# Patient Record
Sex: FEMALE | Race: WHITE | NOT HISPANIC OR LATINO | Employment: OTHER | URBAN - NONMETROPOLITAN AREA
[De-identification: names, ages, dates, MRNs, and addresses within clinical notes are randomized per-mention and may not be internally consistent; named-entity substitution may affect disease eponyms.]

---

## 2019-02-11 ENCOUNTER — OFFICE VISIT (OUTPATIENT)
Dept: UROLOGY | Facility: CLINIC | Age: 54
End: 2019-02-11

## 2019-02-11 ENCOUNTER — HOSPITAL ENCOUNTER (OUTPATIENT)
Dept: GENERAL RADIOLOGY | Facility: HOSPITAL | Age: 54
Discharge: HOME | End: 2019-02-11
Admitting: UROLOGY

## 2019-02-11 VITALS — WEIGHT: 196 LBS | BODY MASS INDEX: 32.65 KG/M2 | HEIGHT: 65 IN

## 2019-02-11 DIAGNOSIS — N20.0 KIDNEY STONE: ICD-10-CM

## 2019-02-11 DIAGNOSIS — N20.1 LEFT URETERAL CALCULUS: ICD-10-CM

## 2019-02-11 DIAGNOSIS — N20.0 KIDNEY STONE: Primary | ICD-10-CM

## 2019-02-11 PROCEDURE — 99204 OFFICE O/P NEW MOD 45 MIN: CPT | Performed by: UROLOGY

## 2019-02-11 PROCEDURE — 74018 RADEX ABDOMEN 1 VIEW: CPT | Performed by: RADIOLOGY

## 2019-02-11 PROCEDURE — 74018 RADEX ABDOMEN 1 VIEW: CPT

## 2019-02-11 RX ORDER — HYDROCODONE BITARTRATE AND ACETAMINOPHEN 10; 325 MG/1; MG/1
TABLET ORAL
Refills: 0 | COMMUNITY
Start: 2018-12-11

## 2019-02-11 RX ORDER — TAMSULOSIN HYDROCHLORIDE 0.4 MG/1
CAPSULE ORAL
Refills: 5 | COMMUNITY
Start: 2019-01-30 | End: 2019-12-06 | Stop reason: SDUPTHER

## 2019-02-11 RX ORDER — FUROSEMIDE 20 MG/1
TABLET ORAL DAILY
Refills: 5 | COMMUNITY
Start: 2019-01-27

## 2019-02-11 RX ORDER — ERGOCALCIFEROL 1.25 MG/1
CAPSULE ORAL
Refills: 5 | COMMUNITY
Start: 2019-01-27 | End: 2019-12-23 | Stop reason: ALTCHOICE

## 2019-02-11 RX ORDER — LEVOTHYROXINE SODIUM 0.05 MG/1
TABLET ORAL
Refills: 5 | COMMUNITY
Start: 2019-01-27

## 2019-02-11 RX ORDER — CHLORAL HYDRATE 500 MG
CAPSULE ORAL
Refills: 6 | COMMUNITY
Start: 2019-01-03

## 2019-02-11 RX ORDER — AMITRIPTYLINE HYDROCHLORIDE 25 MG/1
TABLET, FILM COATED ORAL
Refills: 5 | COMMUNITY
Start: 2019-01-27

## 2019-02-11 RX ORDER — HYDROXYZINE PAMOATE 25 MG/1
CAPSULE ORAL
Refills: 6 | COMMUNITY
Start: 2019-01-27 | End: 2019-12-06 | Stop reason: SDUPTHER

## 2019-02-11 RX ORDER — GLIPIZIDE 10 MG/1
10 TABLET ORAL 2 TIMES DAILY
Refills: 5 | COMMUNITY
Start: 2019-01-30 | End: 2019-12-23 | Stop reason: ALTCHOICE

## 2019-02-11 RX ORDER — INSULIN HUMAN 100 [IU]/ML
INJECTION, SUSPENSION SUBCUTANEOUS
Refills: 5 | COMMUNITY
Start: 2019-01-15 | End: 2019-12-23 | Stop reason: ALTCHOICE

## 2019-02-11 RX ORDER — GABAPENTIN 300 MG/1
CAPSULE ORAL
Refills: 0 | COMMUNITY
Start: 2019-02-08

## 2019-02-11 RX ORDER — BLOOD SUGAR DIAGNOSTIC
STRIP MISCELLANEOUS
Refills: 5 | COMMUNITY
Start: 2019-02-07

## 2019-02-11 RX ORDER — OXYCODONE AND ACETAMINOPHEN 10; 325 MG/1; MG/1
1 TABLET ORAL EVERY 6 HOURS PRN
Qty: 15 TABLET | Refills: 0 | Status: SHIPPED | OUTPATIENT
Start: 2019-02-11 | End: 2019-12-06

## 2019-02-11 RX ORDER — DICYCLOMINE HCL 20 MG
TABLET ORAL
Refills: 6 | COMMUNITY
Start: 2019-01-27

## 2019-02-11 RX ORDER — PRAVASTATIN SODIUM 10 MG
TABLET ORAL
Refills: 6 | COMMUNITY
Start: 2019-02-06

## 2019-02-11 RX ORDER — PEN NEEDLE, DIABETIC 31 GX5/16"
NEEDLE, DISPOSABLE MISCELLANEOUS
Refills: 5 | COMMUNITY
Start: 2019-01-27

## 2019-02-11 RX ORDER — FENOFIBRATE 134 MG/1
CAPSULE ORAL
Refills: 4 | COMMUNITY
Start: 2019-01-27 | End: 2021-09-13 | Stop reason: SDUPTHER

## 2019-02-11 RX ORDER — HYDROCHLOROTHIAZIDE 25 MG/1
TABLET ORAL DAILY
Refills: 5 | COMMUNITY
Start: 2019-01-27

## 2019-02-11 RX ORDER — ATENOLOL 50 MG/1
50 TABLET ORAL DAILY
Refills: 5 | COMMUNITY
Start: 2019-01-27

## 2019-02-11 NOTE — PROGRESS NOTES
Chief Complaint:          Chief Complaint   Patient presents with   • Nephrolithiasis       HPI:   53 y.o. female.  53-year-old white female who was referred from the [facility] I seen her several years ago.  She brought with her ultrasound reports indicating a previous cholecystectomy, diffuse fatty infiltration of the liver and the suspected upper pole left renal calculus with a questionable small left cyst.  She's having significant pain consistent with the ureter stone she is on chronic pain medication from [facility].  I went ahead and got a KUB today indicating the stone has migrated to the distal ureter.  I'm going to give her pain medication, Flomax and I'll see her back on Thursday, February 14 for repeat evaluation and consideration of whether to proceed with surgical intervention.  It's my gut feeling that she has a high likelihood of spontaneous passage    Past Medical History:      History reviewed. No pertinent past medical history.      Current Meds:     Current Outpatient Medications   Medication Sig Dispense Refill   • amitriptyline (ELAVIL) 25 MG tablet TK 1 T PO  HS  5   • atenolol (TENORMIN) 50 MG tablet Take 50 mg by mouth Daily.  5   • B-D ULTRAFINE III SHORT PEN 31G X 8 MM misc U UTD  5   • DEXILANT 30 MG capsule TK 1 C PO QD  5   • dicyclomine (BENTYL) 20 MG tablet TK 1 T PO  TID PRN  6   • fenofibrate micronized (LOFIBRA) 134 MG capsule TK ONE C PO  QD  4   • furosemide (LASIX) 20 MG tablet Take  by mouth Daily.  5   • gabapentin (NEURONTIN) 300 MG capsule TK ONE C PO  TID  0   • glipiZIDE (GLUCOTROL) 10 MG tablet Take 10 mg by mouth 2 (Two) Times a Day.  5   • HUMULIN 70/30 KWIKPEN (70-30) 100 UNIT/ML suspension pen-injector INJECT 60 UNITS UNDER THE SKIN BID  5   • hydrochlorothiazide (HYDRODIURIL) 25 MG tablet Take  by mouth Daily.  5   • HYDROcodone-acetaminophen (NORCO)  MG per tablet TK 1 T PO  BID PRN  0   • hydrOXYzine pamoate (VISTARIL) 25 MG capsule TK  ONE C PO  TID  6   • levothyroxine (SYNTHROID, LEVOTHROID) 50 MCG tablet TK 1 T PO  DAILY  5   • Omega-3 Fatty Acids (FISH OIL) 1000 MG capsule capsule TK 1 C PO QID  6   • ONETOUCH VERIO test strip USE TO TEST SUGAR BEFORE MEALS AND AT BEDTIME  5   • pravastatin (PRAVACHOL) 10 MG tablet TK 1 T PO  QHS  6   • tamsulosin (FLOMAX) 0.4 MG capsule 24 hr capsule TK ONE C PO DAILY  5   • vitamin D (ERGOCALCIFEROL) 13136 units capsule capsule TK 1 C PO ONCE WEEKLY  5     No current facility-administered medications for this visit.         Allergies:      Allergies   Allergen Reactions   • Azithromycin Itching   • Tylenol With Codeine #3 [Acetaminophen-Codeine] Arrhythmia        Past Surgical History:     History reviewed. No pertinent surgical history.      Social History:     Social History     Socioeconomic History   • Marital status: Unknown     Spouse name: Not on file   • Number of children: Not on file   • Years of education: Not on file   • Highest education level: Not on file   Social Needs   • Financial resource strain: Not on file   • Food insecurity - worry: Not on file   • Food insecurity - inability: Not on file   • Transportation needs - medical: Not on file   • Transportation needs - non-medical: Not on file   Occupational History   • Not on file   Tobacco Use   • Smoking status: Not on file   Substance and Sexual Activity   • Alcohol use: Not on file   • Drug use: Not on file   • Sexual activity: Not on file   Other Topics Concern   • Not on file   Social History Narrative   • Not on file       Family History:     History reviewed. No pertinent family history.    Review of Systems:     Review of Systems   Constitutional: Negative.  Negative for activity change, appetite change, chills, diaphoresis, fatigue and unexpected weight change.   HENT: Negative for congestion, dental problem, drooling, ear discharge, ear pain, facial swelling, hearing loss, mouth sores, nosebleeds, postnasal drip, rhinorrhea, sinus  pressure, sneezing, sore throat, tinnitus, trouble swallowing and voice change.    Eyes: Negative.  Negative for photophobia, pain, discharge, redness, itching and visual disturbance.   Respiratory: Negative.  Negative for apnea, cough, choking, chest tightness, shortness of breath, wheezing and stridor.    Cardiovascular: Negative.  Negative for chest pain, palpitations and leg swelling.   Gastrointestinal: Negative.  Negative for abdominal distention, abdominal pain, anal bleeding, blood in stool, constipation, diarrhea, nausea, rectal pain and vomiting.   Endocrine: Negative.  Negative for cold intolerance, heat intolerance, polydipsia, polyphagia and polyuria.   Genitourinary: Positive for frequency, pelvic pain and urgency.   Musculoskeletal: Negative.  Negative for arthralgias, back pain, gait problem, joint swelling, myalgias, neck pain and neck stiffness.   Skin: Negative.  Negative for color change, pallor, rash and wound.   Allergic/Immunologic: Negative.  Negative for environmental allergies, food allergies and immunocompromised state.   Neurological: Negative.  Negative for dizziness, tremors, seizures, syncope, facial asymmetry, speech difficulty, weakness, light-headedness, numbness and headaches.   Hematological: Negative.  Negative for adenopathy. Does not bruise/bleed easily.   Psychiatric/Behavioral: Negative for agitation, behavioral problems, confusion, decreased concentration, dysphoric mood, hallucinations, self-injury, sleep disturbance and suicidal ideas. The patient is not nervous/anxious and is not hyperactive.    All other systems reviewed and are negative.      Physical Exam:     Physical Exam   Constitutional: She appears well-developed and well-nourished.   HENT:   Head: Normocephalic and atraumatic.   Right Ear: External ear normal.   Left Ear: External ear normal.   Mouth/Throat: Oropharynx is clear and moist.   Eyes: Conjunctivae are normal. Pupils are equal, round, and reactive to  light.   Cardiovascular: Normal rate, regular rhythm, normal heart sounds and intact distal pulses.   Pulmonary/Chest: Effort normal and breath sounds normal.   Abdominal: Soft. Bowel sounds are normal. She exhibits no distension and no mass. There is no tenderness. There is no rebound and no guarding.   Genitourinary: No vaginal discharge found.   Musculoskeletal: Normal range of motion.   Neurological: She is alert. She has normal reflexes.   Skin: Skin is warm and dry.   Psychiatric: She has a normal mood and affect. Her behavior is normal. Judgment and thought content normal.       I have reviewed the following portions of the patient's history: allergies, current medications, past family history, past medical history, past social history, past surgical history, problem list and ROS and confirm it's accurate.      Procedure:       Assessment/Plan:   Ureteral calculus-patient has been diagnosed with a ureteral calculus.  We have discussed the various parameters regarding spontaneous passage including the notion that a 2 mm stone has a high likelihood of spontaneous passage versus a larger stone being caught up in the upper areas of the urinary tract.  We also discussed the medical management of stone disease and the use of medical expulsive therapy in the form of Flomax.  This is used in an off label setting.  We discussed the indicators for intervention including  absolute indicators such as sepsis and uncontrollable severe pain as well as  the relative indicators of moderate pain that is well-controlled with various analgesia.  I also talked about nonoperative management including ambulation and increasing fluids and hot tub as being an effective adjuncts in the treatment of a ureteral stone.     Patient's Body mass index is 33.12 kg/m². BMI is above normal parameters. Recommendations include: educational material.          This document has been electronically signed by ROSALIND WILLIAM MD February 11,  2019 8:25 AM

## 2019-02-14 ENCOUNTER — OFFICE VISIT (OUTPATIENT)
Dept: UROLOGY | Facility: CLINIC | Age: 54
End: 2019-02-14

## 2019-02-14 VITALS — WEIGHT: 196 LBS | BODY MASS INDEX: 32.65 KG/M2 | HEIGHT: 65 IN

## 2019-02-14 DIAGNOSIS — N20.1 LEFT URETERAL CALCULUS: Primary | ICD-10-CM

## 2019-02-14 PROCEDURE — 99213 OFFICE O/P EST LOW 20 MIN: CPT | Performed by: UROLOGY

## 2019-02-14 NOTE — PROGRESS NOTES
Chief Complaint:          Chief Complaint   Patient presents with   • Flank Pain     f/u       HPI:   53 y.o. female.  53-year-old white female who was referred from the [facility] I seen her several years ago.  She brought with her ultrasound reports indicating a previous cholecystectomy, diffuse fatty infiltration of the liver and the suspected upper pole left renal calculus with a questionable small left cyst.  She's having significant pain consistent with the ureter stone she is on chronic pain medication from [facility].  I went ahead and got a KUB today indicating the stone has migrated to the distal ureter.  I'm going to give her pain medication, Flomax and I'll see her back on Thursday, February 14 for repeat evaluation and consideration of whether to proceed with surgical intervention.  It's my gut feeling that she has a high likelihood of spontaneous passage    She returns she still hasn't passed the stone she has adequate supplies of pain medication if she doesn't pass it I'm when he give her one additional.  An enema to recommend surgical intervention I'll see her back in 2 weeks  Past Medical History:        Past Medical History:   Diagnosis Date   • Chronic back pain    • Diabetes mellitus (CMS/HCC)    • Diverticulitis    • Gastritis    • High cholesterol    • Hypertension    • IBS (irritable bowel syndrome)          Current Meds:     Current Outpatient Medications   Medication Sig Dispense Refill   • amitriptyline (ELAVIL) 25 MG tablet TK 1 T PO  HS  5   • atenolol (TENORMIN) 50 MG tablet Take 50 mg by mouth Daily.  5   • B-D ULTRAFINE III SHORT PEN 31G X 8 MM misc U UTD  5   • DEXILANT 30 MG capsule TK 1 C PO QD  5   • dicyclomine (BENTYL) 20 MG tablet TK 1 T PO  TID PRN  6   • fenofibrate micronized (LOFIBRA) 134 MG capsule TK ONE C PO  QD  4   • furosemide (LASIX) 20 MG tablet Take  by mouth Daily.  5   • gabapentin (NEURONTIN) 300 MG capsule TK ONE C PO  TID  0   • glipiZIDE  (GLUCOTROL) 10 MG tablet Take 10 mg by mouth 2 (Two) Times a Day.  5   • HUMULIN 70/30 KWIKPEN (70-30) 100 UNIT/ML suspension pen-injector INJECT 60 UNITS UNDER THE SKIN BID  5   • hydrochlorothiazide (HYDRODIURIL) 25 MG tablet Take  by mouth Daily.  5   • HYDROcodone-acetaminophen (NORCO)  MG per tablet TK 1 T PO  BID PRN  0   • hydrOXYzine pamoate (VISTARIL) 25 MG capsule TK ONE C PO  TID  6   • levothyroxine (SYNTHROID, LEVOTHROID) 50 MCG tablet TK 1 T PO  DAILY  5   • Omega-3 Fatty Acids (FISH OIL) 1000 MG capsule capsule TK 1 C PO QID  6   • ONETOUCH VERIO test strip USE TO TEST SUGAR BEFORE MEALS AND AT BEDTIME  5   • oxyCODONE-acetaminophen (PERCOCET)  MG per tablet Take 1 tablet by mouth Every 6 (Six) Hours As Needed for Severe Pain . 15 tablet 0   • pravastatin (PRAVACHOL) 10 MG tablet TK 1 T PO  QHS  6   • tamsulosin (FLOMAX) 0.4 MG capsule 24 hr capsule TK ONE C PO DAILY  5   • vitamin D (ERGOCALCIFEROL) 29999 units capsule capsule TK 1 C PO ONCE WEEKLY  5     No current facility-administered medications for this visit.         Allergies:      Allergies   Allergen Reactions   • Azithromycin Itching   • Tylenol With Codeine #3 [Acetaminophen-Codeine] Arrhythmia        Past Surgical History:     Past Surgical History:   Procedure Laterality Date   • APPENDECTOMY     • CHOLECYSTECTOMY     • HYSTERECTOMY           Social History:     Social History     Socioeconomic History   • Marital status:      Spouse name: Not on file   • Number of children: Not on file   • Years of education: Not on file   • Highest education level: Not on file   Social Needs   • Financial resource strain: Not on file   • Food insecurity - worry: Not on file   • Food insecurity - inability: Not on file   • Transportation needs - medical: Not on file   • Transportation needs - non-medical: Not on file   Occupational History   • Not on file   Tobacco Use   • Smoking status: Former Smoker   • Smokeless tobacco: Never  Used   Substance and Sexual Activity   • Alcohol use: No     Frequency: Never   • Drug use: No   • Sexual activity: Yes     Partners: Male     Birth control/protection: None   Other Topics Concern   • Not on file   Social History Narrative   • Not on file       Family History:     Family History   Problem Relation Age of Onset   • No Known Problems Father    • No Known Problems Mother        Review of Systems:     Review of Systems   Constitutional: Negative.  Negative for activity change, appetite change, chills, diaphoresis, fatigue and unexpected weight change.   HENT: Negative for congestion, dental problem, drooling, ear discharge, ear pain, facial swelling, hearing loss, mouth sores, nosebleeds, postnasal drip, rhinorrhea, sinus pressure, sneezing, sore throat, tinnitus, trouble swallowing and voice change.    Eyes: Negative.  Negative for photophobia, pain, discharge, redness, itching and visual disturbance.   Respiratory: Negative.  Negative for apnea, cough, choking, chest tightness, shortness of breath, wheezing and stridor.    Cardiovascular: Negative.  Negative for chest pain, palpitations and leg swelling.   Gastrointestinal: Negative.  Negative for abdominal distention, abdominal pain, anal bleeding, blood in stool, constipation, diarrhea, nausea, rectal pain and vomiting.   Endocrine: Negative.  Negative for cold intolerance, heat intolerance, polydipsia, polyphagia and polyuria.   Musculoskeletal: Negative.  Negative for arthralgias, back pain, gait problem, joint swelling, myalgias, neck pain and neck stiffness.   Skin: Negative.  Negative for color change, pallor, rash and wound.   Allergic/Immunologic: Negative.  Negative for environmental allergies, food allergies and immunocompromised state.   Neurological: Negative.  Negative for dizziness, tremors, seizures, syncope, facial asymmetry, speech difficulty, weakness, light-headedness, numbness and headaches.   Hematological: Negative.  Negative  for adenopathy. Does not bruise/bleed easily.   Psychiatric/Behavioral: Negative for agitation, behavioral problems, confusion, decreased concentration, dysphoric mood, hallucinations, self-injury, sleep disturbance and suicidal ideas. The patient is not nervous/anxious and is not hyperactive.    All other systems reviewed and are negative.      Physical Exam:     Physical Exam   Constitutional: She appears well-developed and well-nourished.   HENT:   Head: Normocephalic and atraumatic.   Right Ear: External ear normal.   Left Ear: External ear normal.   Mouth/Throat: Oropharynx is clear and moist.   Eyes: Conjunctivae are normal. Pupils are equal, round, and reactive to light.   Cardiovascular: Normal rate, regular rhythm, normal heart sounds and intact distal pulses.   Pulmonary/Chest: Effort normal and breath sounds normal.   Abdominal: Soft. Bowel sounds are normal. She exhibits no distension and no mass. There is no tenderness. There is no rebound and no guarding.   Genitourinary: No vaginal discharge found.   Musculoskeletal: Normal range of motion.   Neurological: She is alert. She has normal reflexes.   Skin: Skin is warm and dry.   Psychiatric: She has a normal mood and affect. Her behavior is normal. Judgment and thought content normal.       I have reviewed the following portions of the patient's history: allergies, current medications, past family history, past medical history, past social history, past surgical history, problem list and ROS and confirm it's accurate.      Procedure:       Assessment/Plan:   Ureteral calculus-patient has been diagnosed with a ureteral calculus.  We have discussed the various parameters regarding spontaneous passage including the notion that a 2 mm stone has a high likelihood of spontaneous passage versus a larger stone being caught up in the upper areas of the urinary tract.  We also discussed the medical management of stone disease and the use of medical expulsive  therapy in the form of Flomax.  This is used in an off label setting.  We discussed the indicators for intervention including  absolute indicators such as sepsis and uncontrollable severe pain as well as  the relative indicators of moderate pain that is well-controlled with various analgesia.  I also talked about nonoperative management including ambulation and increasing fluids and hot tub as being an effective adjuncts in the treatment of a ureteral stone.     Patient's Body mass index is 33.12 kg/m². BMI is above normal parameters. Recommendations include: educational material.          This document has been electronically signed by ROSALIND WILLIAM MD February 14, 2019 3:06 PM

## 2019-02-15 ENCOUNTER — TELEPHONE (OUTPATIENT)
Dept: UROLOGY | Facility: CLINIC | Age: 54
End: 2019-02-15

## 2019-02-15 DIAGNOSIS — R11.2 NAUSEA AND VOMITING, INTRACTABILITY OF VOMITING NOT SPECIFIED, UNSPECIFIED VOMITING TYPE: Primary | ICD-10-CM

## 2019-02-15 RX ORDER — ONDANSETRON 4 MG/1
4 TABLET, FILM COATED ORAL DAILY PRN
Qty: 30 TABLET | Refills: 1 | Status: SHIPPED | OUTPATIENT
Start: 2019-02-15

## 2019-02-15 NOTE — TELEPHONE ENCOUNTER
Patient called and needs zofran called to her pharmacy. She states that Dr. Gunderson offered it to her at her visit in the [place] office 2/14/19.

## 2019-12-03 ENCOUNTER — OFFICE VISIT (OUTPATIENT)
Dept: UROLOGY | Facility: CLINIC | Age: 54
End: 2019-12-03

## 2019-12-03 ENCOUNTER — HOSPITAL ENCOUNTER (OUTPATIENT)
Dept: GENERAL RADIOLOGY | Facility: HOSPITAL | Age: 54
Discharge: HOME | End: 2019-12-03
Admitting: UROLOGY

## 2019-12-03 DIAGNOSIS — R10.9 FLANK PAIN: Primary | ICD-10-CM

## 2019-12-03 DIAGNOSIS — R10.9 FLANK PAIN: ICD-10-CM

## 2019-12-03 DIAGNOSIS — N20.1 LEFT URETERAL CALCULUS: ICD-10-CM

## 2019-12-03 LAB
BILIRUB BLD-MCNC: NEGATIVE MG/DL
CLARITY, POC: CLEAR
COLOR UR: YELLOW
GLUCOSE UR STRIP-MCNC: NEGATIVE MG/DL
KETONES UR QL: NEGATIVE
LEUKOCYTE EST, POC: NEGATIVE
NITRITE UR-MCNC: NEGATIVE MG/ML
PH UR: 6 [PH] (ref 5–8)
PROT UR STRIP-MCNC: ABNORMAL MG/DL
RBC # UR STRIP: ABNORMAL /UL
SP GR UR: 1.02 (ref 1–1.03)
UROBILINOGEN UR QL: NORMAL

## 2019-12-03 PROCEDURE — 74018 RADEX ABDOMEN 1 VIEW: CPT

## 2019-12-03 PROCEDURE — 99213 OFFICE O/P EST LOW 20 MIN: CPT | Performed by: UROLOGY

## 2019-12-03 PROCEDURE — 74018 RADEX ABDOMEN 1 VIEW: CPT | Performed by: RADIOLOGY

## 2019-12-03 RX ORDER — TAMSULOSIN HYDROCHLORIDE 0.4 MG/1
1 CAPSULE ORAL NIGHTLY
Qty: 30 CAPSULE | Refills: 5 | Status: SHIPPED | OUTPATIENT
Start: 2019-12-03 | End: 2021-05-06 | Stop reason: SDUPTHER

## 2019-12-03 RX ORDER — PROMETHAZINE HYDROCHLORIDE 25 MG/1
25 TABLET ORAL EVERY 6 HOURS PRN
Qty: 21 TABLET | Refills: 2 | Status: SHIPPED | OUTPATIENT
Start: 2019-12-03

## 2019-12-03 RX ORDER — OXYCODONE AND ACETAMINOPHEN 10; 325 MG/1; MG/1
1 TABLET ORAL EVERY 6 HOURS PRN
Qty: 12 TABLET | Refills: 0 | Status: SHIPPED | OUTPATIENT
Start: 2019-12-03 | End: 2021-12-06 | Stop reason: ALTCHOICE

## 2019-12-03 NOTE — PROGRESS NOTES
Chief Complaint:          Chief Complaint   Patient presents with   • Flank Pain       HPI:   54 y.o. female who was referred from the [facility] I seen her several years ago.  She brought with her ultrasound reports indicating a previous cholecystectomy, diffuse fatty infiltration of the liver and the suspected upper pole left renal calculus with a questionable small left cyst.  She's having significant pain consistent with the ureter stone she is on chronic pain medication from [facility].  I went ahead and got a KUB today indicating the stone has migrated to the distal ureter.  I'm going to give her pain medication, Flomax and I'll see her back on Thursday, February 14 for repeat evaluation and consideration of whether to proceed with surgical intervention.  It's my gut feeling that she has a high likelihood of spontaneous passage    She returns she still hasn't passed the stone she has adequate supplies of pain medication if she doesn't pass it I'm when he give her one additional.  An enema to recommend surgical intervention I'll see her back in 2 weeks.  She returns today.  Her urine is positive for blood she has significant left-sided stone pain.  Appears to have a small distal left ureteral calculus on the KUB.  She has no absolute indicators for intervention.  I will set her up for pain medication I will see her back on 12 6 in the afternoon if she has persistent stones I am in a recommend intervention next week      Past Medical History:        Past Medical History:   Diagnosis Date   • Chronic back pain    • Diabetes mellitus (CMS/HCC)    • Diverticulitis    • Gastritis    • High cholesterol    • Hypertension    • IBS (irritable bowel syndrome)          Current Meds:     Current Outpatient Medications   Medication Sig Dispense Refill   • amitriptyline (ELAVIL) 25 MG tablet TK 1 T PO  HS  5   • atenolol (TENORMIN) 50 MG tablet Take 50 mg by mouth Daily.  5   • B-D ULTRAFINE III SHORT  PEN 31G X 8 MM misc U UTD  5   • DEXILANT 30 MG capsule TK 1 C PO QD  5   • dicyclomine (BENTYL) 20 MG tablet TK 1 T PO  TID PRN  6   • fenofibrate micronized (LOFIBRA) 134 MG capsule TK ONE C PO  QD  4   • furosemide (LASIX) 20 MG tablet Take  by mouth Daily.  5   • gabapentin (NEURONTIN) 300 MG capsule TK ONE C PO  TID  0   • glipiZIDE (GLUCOTROL) 10 MG tablet Take 10 mg by mouth 2 (Two) Times a Day.  5   • HUMULIN 70/30 KWIKPEN (70-30) 100 UNIT/ML suspension pen-injector INJECT 60 UNITS UNDER THE SKIN BID  5   • hydrochlorothiazide (HYDRODIURIL) 25 MG tablet Take  by mouth Daily.  5   • HYDROcodone-acetaminophen (NORCO)  MG per tablet TK 1 T PO  BID PRN  0   • hydrOXYzine pamoate (VISTARIL) 25 MG capsule TK ONE C PO  TID  6   • levothyroxine (SYNTHROID, LEVOTHROID) 50 MCG tablet TK 1 T PO  DAILY  5   • Omega-3 Fatty Acids (FISH OIL) 1000 MG capsule capsule TK 1 C PO QID  6   • ondansetron (ZOFRAN) 4 MG tablet Take 1 tablet by mouth Daily As Needed for Nausea or Vomiting. 30 tablet 1   • ONETOUCH VERIO test strip USE TO TEST SUGAR BEFORE MEALS AND AT BEDTIME  5   • oxyCODONE-acetaminophen (PERCOCET)  MG per tablet Take 1 tablet by mouth Every 6 (Six) Hours As Needed for Severe Pain . 15 tablet 0   • pravastatin (PRAVACHOL) 10 MG tablet TK 1 T PO  QHS  6   • tamsulosin (FLOMAX) 0.4 MG capsule 24 hr capsule TK ONE C PO DAILY  5   • vitamin D (ERGOCALCIFEROL) 25378 units capsule capsule TK 1 C PO ONCE WEEKLY  5     No current facility-administered medications for this visit.         Allergies:      Allergies   Allergen Reactions   • Azithromycin Itching   • Tylenol With Codeine #3 [Acetaminophen-Codeine] Arrhythmia        Past Surgical History:     Past Surgical History:   Procedure Laterality Date   • APPENDECTOMY     • CHOLECYSTECTOMY     • HYSTERECTOMY           Social History:     Social History     Socioeconomic History   • Marital status:      Spouse name: Not on file   • Number of children:  Not on file   • Years of education: Not on file   • Highest education level: Not on file   Tobacco Use   • Smoking status: Former Smoker   • Smokeless tobacco: Never Used   Substance and Sexual Activity   • Alcohol use: No     Frequency: Never   • Drug use: No   • Sexual activity: Yes     Partners: Male     Birth control/protection: None       Family History:     Family History   Problem Relation Age of Onset   • No Known Problems Father    • No Known Problems Mother        Review of Systems:     Review of Systems   Constitutional: Negative.  Negative for activity change, appetite change, chills, diaphoresis, fatigue and unexpected weight change.   HENT: Negative for congestion, dental problem, drooling, ear discharge, ear pain, facial swelling, hearing loss, mouth sores, nosebleeds, postnasal drip, rhinorrhea, sinus pressure, sneezing, sore throat, tinnitus, trouble swallowing and voice change.    Eyes: Negative.  Negative for photophobia, pain, discharge, redness, itching and visual disturbance.   Respiratory: Negative.  Negative for apnea, cough, choking, chest tightness, shortness of breath, wheezing and stridor.    Cardiovascular: Negative.  Negative for chest pain, palpitations and leg swelling.   Gastrointestinal: Negative.  Negative for abdominal distention, abdominal pain, anal bleeding, blood in stool, constipation, diarrhea, nausea, rectal pain and vomiting.   Endocrine: Negative.  Negative for cold intolerance, heat intolerance, polydipsia, polyphagia and polyuria.   Musculoskeletal: Negative.  Negative for arthralgias, back pain, gait problem, joint swelling, myalgias, neck pain and neck stiffness.   Skin: Negative.  Negative for color change, pallor, rash and wound.   Allergic/Immunologic: Negative.  Negative for environmental allergies, food allergies and immunocompromised state.   Neurological: Negative.  Negative for dizziness, tremors, seizures, syncope, facial asymmetry, speech difficulty,  weakness, light-headedness, numbness and headaches.   Hematological: Negative.  Negative for adenopathy. Does not bruise/bleed easily.   Psychiatric/Behavioral: Negative for agitation, behavioral problems, confusion, decreased concentration, dysphoric mood, hallucinations, self-injury, sleep disturbance and suicidal ideas. The patient is not nervous/anxious and is not hyperactive.    All other systems reviewed and are negative.      Physical Exam:     Physical Exam   Constitutional: She appears well-developed and well-nourished.   HENT:   Head: Normocephalic and atraumatic.   Right Ear: External ear normal.   Left Ear: External ear normal.   Mouth/Throat: Oropharynx is clear and moist.   Eyes: Conjunctivae are normal. Pupils are equal, round, and reactive to light.   Cardiovascular: Normal rate, regular rhythm, normal heart sounds and intact distal pulses.   Pulmonary/Chest: Effort normal and breath sounds normal.   Abdominal: Soft. Bowel sounds are normal. She exhibits no distension and no mass. There is no tenderness. There is no rebound and no guarding.   Genitourinary: No vaginal discharge found.   Musculoskeletal: Normal range of motion.   Neurological: She is alert. She has normal reflexes.   Skin: Skin is warm and dry.   Psychiatric: She has a normal mood and affect. Her behavior is normal. Judgment and thought content normal.       I have reviewed the following portions of the patient's history: allergies, current medications, past family history, past medical history, past social history, past surgical history, problem list and ROS and confirm it's accurate.      Procedure:       Assessment/Plan:   Ureteral calculus-patient has been diagnosed with a ureteral calculus.  We have discussed the various parameters regarding spontaneous passage including the notion that a 2 mm stone has a high likelihood of spontaneous passage versus a larger stone being caught up in the upper areas of the urinary tract.  We also  discussed the medical management of stone disease and the use of medical expulsive therapy in the form of Flomax.  This is used in an off label setting.  We discussed the indicators for intervention including  absolute indicators such as sepsis and uncontrollable severe pain as well as  the relative indicators of moderate pain that is well-controlled with various analgesia.  I also talked about nonoperative management including ambulation and increasing fluids and hot tub as being an effective adjuncts in the treatment of a ureteral stone.  Narcotic pain medication-patient has significant acute pain that I believe would be an indication for the use of narcotic pain medication.  I discussed the significant risks of pain medication and the fact that this will be a short only option and I will give her no more than a three-day supply of pain medication.  And I will not plan long-term medication and that this will be sent to a pain clinic if at all becomes necessary.  We discussed signing a pain medication agreement and the fact that we're going to run a state [facility] review to be sure the patient is not getting pain medication from elsewhere.  If this is the case we will not give pain medication.  As part of the patient's treatment plan of there being prescribed a controlled substance with potential for abuse.  This patient has been wait aware of the appropriate dose of such medications including, the risk for somnolence, limited ability to drive and/or safety and the significant potential for overdose.  It is been made clear that these medications are for the prescribed patient only without concomitant use of alcohol or other sepsis unless prescribed by the medical provider.  Has completed prescribing agreement detailing the terms of continue prescribing him a controlled substance.  Including monitoring Christie ports, the possibility of urine drug screens and pedal counts.  The patient is aware that we reviewed [facility]RISTIE  reports on a regular basis and scan them into the chart.  History and physical examination exhibited continued safe and appropriate use of controlled substances.  Also discussed the fact that the new [place] legislation allows only a three-day prescription for pain medication.  In this situation he will be referred to a chronic pain clinic.            Patient's There is no height or weight on file to calculate BMI. BMI is above normal parameters. Recommendations include: educational material.              This document has been electronically signed by ROSALIND WILLIAM MD December 3, 2019 10:55 AM

## 2019-12-06 ENCOUNTER — OFFICE VISIT (OUTPATIENT)
Dept: UROLOGY | Facility: CLINIC | Age: 54
End: 2019-12-06

## 2019-12-06 VITALS — HEIGHT: 65 IN | WEIGHT: 223 LBS | BODY MASS INDEX: 37.15 KG/M2

## 2019-12-06 DIAGNOSIS — N20.1 LEFT URETERAL CALCULUS: Primary | ICD-10-CM

## 2019-12-06 PROCEDURE — 99213 OFFICE O/P EST LOW 20 MIN: CPT | Performed by: UROLOGY

## 2019-12-06 RX ORDER — LISINOPRIL 5 MG/1
TABLET ORAL DAILY
Refills: 2 | COMMUNITY
Start: 2019-11-29

## 2019-12-06 RX ORDER — HYDROXYZINE HYDROCHLORIDE 25 MG/1
TABLET, FILM COATED ORAL
Refills: 2 | COMMUNITY
Start: 2019-11-29 | End: 2021-12-06 | Stop reason: ALTCHOICE

## 2019-12-06 RX ORDER — VENLAFAXINE HYDROCHLORIDE 150 MG/1
CAPSULE, EXTENDED RELEASE ORAL
Refills: 4 | COMMUNITY
Start: 2019-11-29

## 2019-12-06 RX ORDER — HYDROCODONE BITARTRATE AND ACETAMINOPHEN 10; 325 MG/1; MG/1
1 TABLET ORAL EVERY 6 HOURS PRN
Qty: 20 TABLET | Refills: 0 | Status: SHIPPED | OUTPATIENT
Start: 2019-12-06 | End: 2021-05-06 | Stop reason: SDUPTHER

## 2019-12-06 NOTE — PROGRESS NOTES
Chief Complaint:          Chief Complaint   Patient presents with   • Flank Pain     3 day rtn       HPI:   54 y.o. female who was referred from the [facility] I seen her several years ago.  She brought with her ultrasound reports indicating a previous cholecystectomy, diffuse fatty infiltration of the liver and the suspected upper pole left renal calculus with a questionable small left cyst.  She's having significant pain consistent with the ureter stone she is on chronic pain medication from [facility].  I went ahead and got a KUB today indicating the stone has migrated to the distal ureter.  I'm going to give her pain medication, Flomax and I'll see her back on Thursday, February 14 for repeat evaluation and consideration of whether to proceed with surgical intervention.  It's my gut feeling that she has a high likelihood of spontaneous passage    She returns she still hasn't passed the stone she has adequate supplies of pain medication if she doesn't pass it I'm when he give her one additional.  An enema to recommend surgical intervention I'll see her back in 2 weeks.  She returns today.  Her urine is positive for blood she has significant left-sided stone pain.  Appears to have a small distal left ureteral calculus on the KUB.  She has no absolute indicators for intervention.  I will set her up for pain medication I will see her back on 12- 6 in the afternoon if she has persistent stones I am in a recommend intervention next week.  She returns today she still having significant pain on the left side I am to set up for ureteroscopy on Wednesday 12/11      Past Medical History:        Past Medical History:   Diagnosis Date   • Chronic back pain    • Diabetes mellitus (CMS/HCC)    • Diverticulitis    • Gastritis    • High cholesterol    • Hypertension    • IBS (irritable bowel syndrome)          Current Meds:     Current Outpatient Medications   Medication Sig Dispense Refill   •  amitriptyline (ELAVIL) 25 MG tablet TK 1 T PO  HS  5   • atenolol (TENORMIN) 50 MG tablet Take 50 mg by mouth Daily.  5   • B-D ULTRAFINE III SHORT PEN 31G X 8 MM misc U UTD  5   • DEXILANT 30 MG capsule TK 1 C PO QD  5   • dicyclomine (BENTYL) 20 MG tablet TK 1 T PO  TID PRN  6   • fenofibrate micronized (LOFIBRA) 134 MG capsule TK ONE C PO  QD  4   • furosemide (LASIX) 20 MG tablet Take  by mouth Daily.  5   • gabapentin (NEURONTIN) 300 MG capsule TK ONE C PO  TID  0   • glipiZIDE (GLUCOTROL) 10 MG tablet Take 10 mg by mouth 2 (Two) Times a Day.  5   • HUMULIN 70/30 KWIKPEN (70-30) 100 UNIT/ML suspension pen-injector INJECT 60 UNITS UNDER THE SKIN BID  5   • hydrochlorothiazide (HYDRODIURIL) 25 MG tablet Take  by mouth Daily.  5   • HYDROcodone-acetaminophen (NORCO)  MG per tablet TK 1 T PO  BID PRN  0   • hydrOXYzine (ATARAX) 25 MG tablet TK 1 T PO  TID  2   • levothyroxine (SYNTHROID, LEVOTHROID) 50 MCG tablet TK 1 T PO  DAILY  5   • lisinopril (PRINIVIL,ZESTRIL) 5 MG tablet Take  by mouth Daily.  2   • Omega-3 Fatty Acids (FISH OIL) 1000 MG capsule capsule TK 1 C PO QID  6   • ondansetron (ZOFRAN) 4 MG tablet Take 1 tablet by mouth Daily As Needed for Nausea or Vomiting. 30 tablet 1   • ONETOUCH VERIO test strip USE TO TEST SUGAR BEFORE MEALS AND AT BEDTIME  5   • oxyCODONE-acetaminophen (PERCOCET)  MG per tablet Take 1 tablet by mouth Every 6 (Six) Hours As Needed for Moderate Pain . 12 tablet 0   • pravastatin (PRAVACHOL) 10 MG tablet TK 1 T PO  QHS  6   • promethazine (PHENERGAN) 25 MG tablet Take 1 tablet by mouth Every 6 (Six) Hours As Needed for Nausea or Vomiting. 21 tablet 2   • tamsulosin (FLOMAX) 0.4 MG capsule 24 hr capsule Take 1 capsule by mouth Every Night. 30 capsule 5   • venlafaxine XR (EFFEXOR-XR) 150 MG 24 hr capsule TK 1 C PO D  4   • vitamin D (ERGOCALCIFEROL) 01907 units capsule capsule TK 1 C PO ONCE WEEKLY  5     No current facility-administered medications for this visit.          Allergies:      Allergies   Allergen Reactions   • Tylenol With Codeine #3 [Acetaminophen-Codeine] Arrhythmia   • Azithromycin Itching        Past Surgical History:     Past Surgical History:   Procedure Laterality Date   • APPENDECTOMY     • CHOLECYSTECTOMY     • HYSTERECTOMY           Social History:     Social History     Socioeconomic History   • Marital status:      Spouse name: Not on file   • Number of children: Not on file   • Years of education: Not on file   • Highest education level: Not on file   Tobacco Use   • Smoking status: Former Smoker   • Smokeless tobacco: Never Used   Substance and Sexual Activity   • Alcohol use: No     Frequency: Never   • Drug use: No   • Sexual activity: Yes     Partners: Male     Birth control/protection: None       Family History:     Family History   Problem Relation Age of Onset   • No Known Problems Father    • No Known Problems Mother        Review of Systems:     Review of Systems   Constitutional: Negative.  Negative for activity change, appetite change, chills, diaphoresis, fatigue and unexpected weight change.   HENT: Negative for congestion, dental problem, drooling, ear discharge, ear pain, facial swelling, hearing loss, mouth sores, nosebleeds, postnasal drip, rhinorrhea, sinus pressure, sneezing, sore throat, tinnitus, trouble swallowing and voice change.    Eyes: Negative.  Negative for photophobia, pain, discharge, redness, itching and visual disturbance.   Respiratory: Negative.  Negative for apnea, cough, choking, chest tightness, shortness of breath, wheezing and stridor.    Cardiovascular: Negative.  Negative for chest pain, palpitations and leg swelling.   Gastrointestinal: Negative.  Negative for abdominal distention, abdominal pain, anal bleeding, blood in stool, constipation, diarrhea, nausea, rectal pain and vomiting.   Endocrine: Negative.  Negative for cold intolerance, heat intolerance, polydipsia, polyphagia and polyuria.    Musculoskeletal: Negative.  Negative for arthralgias, back pain, gait problem, joint swelling, myalgias, neck pain and neck stiffness.   Skin: Negative.  Negative for color change, pallor, rash and wound.   Allergic/Immunologic: Negative.  Negative for environmental allergies, food allergies and immunocompromised state.   Neurological: Negative.  Negative for dizziness, tremors, seizures, syncope, facial asymmetry, speech difficulty, weakness, light-headedness, numbness and headaches.   Hematological: Negative.  Negative for adenopathy. Does not bruise/bleed easily.   Psychiatric/Behavioral: Negative for agitation, behavioral problems, confusion, decreased concentration, dysphoric mood, hallucinations, self-injury, sleep disturbance and suicidal ideas. The patient is not nervous/anxious and is not hyperactive.    All other systems reviewed and are negative.      Physical Exam:     Physical Exam   Constitutional: She appears well-developed and well-nourished.   HENT:   Head: Normocephalic and atraumatic.   Right Ear: External ear normal.   Left Ear: External ear normal.   Mouth/Throat: Oropharynx is clear and moist.   Eyes: Pupils are equal, round, and reactive to light. Conjunctivae are normal.   Cardiovascular: Normal rate, regular rhythm, normal heart sounds and intact distal pulses.   Pulmonary/Chest: Effort normal and breath sounds normal.   Abdominal: Soft. Bowel sounds are normal. She exhibits no distension and no mass. There is no tenderness. There is no rebound and no guarding.   Genitourinary: No vaginal discharge found.   Musculoskeletal: Normal range of motion.   Neurological: She is alert. She has normal reflexes.   Skin: Skin is warm and dry.   Psychiatric: She has a normal mood and affect. Her behavior is normal. Judgment and thought content normal.       I have reviewed the following portions of the patient's history: allergies, current medications, past family history, past medical history, past  social history, past surgical history, problem list and ROS and confirm it's accurate.      Procedure:       Assessment/Plan:   Ureteral calculus-patient has been diagnosed with a ureteral calculus.  We have discussed the various parameters regarding spontaneous passage including the notion that a 2 mm stone has a high likelihood of spontaneous passage versus a larger stone being caught up in the upper areas of the urinary tract.  We also discussed the medical management of stone disease and the use of medical expulsive therapy in the form of Flomax.  This is used in an off label setting.  We discussed the indicators for intervention including  absolute indicators such as sepsis and uncontrollable severe pain as well as  the relative indicators of moderate pain that is well-controlled with various analgesia.  I also talked about nonoperative management including ambulation and increasing fluids and hot tub as being an effective adjuncts in the treatment of a ureteral stone.  I am to set her up for left-sided ureteroscopy            Patient's Body mass index is 37.69 kg/m². BMI is above normal parameters. Recommendations include: educational material.              This document has been electronically signed by ROSALIND WILLIAM MD December 6, 2019 1:44 PM

## 2019-12-09 RX ORDER — GENTAMICIN SULFATE 80 MG/100ML
80 INJECTION, SOLUTION INTRAVENOUS ONCE
Status: CANCELLED | OUTPATIENT
Start: 2019-12-11 | End: 2019-12-09

## 2019-12-23 ENCOUNTER — OFFICE VISIT (OUTPATIENT)
Dept: UROLOGY | Facility: CLINIC | Age: 54
End: 2019-12-23

## 2019-12-23 ENCOUNTER — HOSPITAL ENCOUNTER (OUTPATIENT)
Dept: GENERAL RADIOLOGY | Facility: HOSPITAL | Age: 54
Discharge: HOME | End: 2019-12-23
Admitting: NURSE PRACTITIONER

## 2019-12-23 VITALS
SYSTOLIC BLOOD PRESSURE: 128 MMHG | DIASTOLIC BLOOD PRESSURE: 68 MMHG | HEART RATE: 88 BPM | BODY MASS INDEX: 36 KG/M2 | HEIGHT: 66 IN | WEIGHT: 224 LBS | RESPIRATION RATE: 16 BRPM

## 2019-12-23 DIAGNOSIS — R10.9 FLANK PAIN: ICD-10-CM

## 2019-12-23 DIAGNOSIS — R10.30 LOWER ABDOMINAL PAIN: Primary | ICD-10-CM

## 2019-12-23 LAB
BILIRUB BLD-MCNC: NEGATIVE MG/DL
CLARITY, POC: CLEAR
COLOR UR: ABNORMAL
GLUCOSE UR STRIP-MCNC: ABNORMAL MG/DL
KETONES UR QL: NEGATIVE
LEUKOCYTE EST, POC: NEGATIVE
NITRITE UR-MCNC: NEGATIVE MG/ML
PH UR: 5.5 [PH] (ref 5–8)
PROT UR STRIP-MCNC: NEGATIVE MG/DL
RBC # UR STRIP: ABNORMAL /UL
SP GR UR: 1.02 (ref 1–1.03)
UROBILINOGEN UR QL: NORMAL

## 2019-12-23 PROCEDURE — 74018 RADEX ABDOMEN 1 VIEW: CPT | Performed by: RADIOLOGY

## 2019-12-23 PROCEDURE — 74018 RADEX ABDOMEN 1 VIEW: CPT

## 2019-12-23 PROCEDURE — 99214 OFFICE O/P EST MOD 30 MIN: CPT | Performed by: NURSE PRACTITIONER

## 2019-12-23 NOTE — PROGRESS NOTES
"Chief Complaint:          Chief Complaint   Patient presents with   • Follow-up     LLQ pain, pain radiates to left lower back area, Pain with urination   • Blood in Urine     Left Flank Pain/ Lower Abdominal pain    HPI:   54 y.o. female.  Patient returns for follow-up today,   she she was a \"No call No show\" for her scheduled surgery appointment on December 11.  She states she was snowed in.    Patient is seen for left flank pain today.  She is having significant left-sided pain and bladder pressure consistent with a ureteral/kidney stone.  She has a history of kidney stone, and chronic back pain.  She states this has been ongoing for 2 weeks, she has had  ER visits in [place] she states.    Upon exam, she has no suprapubic discomfort, no CVA tenderness, no nausea or vomiting, fever or chills.  She reports urinary  symptoms of gross hematuria, dysuria, urgency and frequency.  Her urine dipstick is negative for infection, she has 3+ microscopic hematuria.    We will get a KUB      Past Medical History:        Past Medical History:   Diagnosis Date   • Chronic back pain    • Diabetes mellitus (CMS/HCC)    • Diverticulitis    • Gastritis    • High cholesterol    • Hypertension    • IBS (irritable bowel syndrome)      The following portions of the patient's history were reviewed and updated as appropriate: allergies, current medications, past family history, past medical history, past social history, past surgical history and problem list.    Current Meds:     Current Outpatient Medications   Medication Sig Dispense Refill   • amitriptyline (ELAVIL) 25 MG tablet TK 1 T PO  HS  5   • atenolol (TENORMIN) 50 MG tablet Take 50 mg by mouth Daily.  5   • B-D ULTRAFINE III SHORT PEN 31G X 8 MM misc U UTD  5   • DEXILANT 30 MG capsule TK 1 C PO QD  5   • dicyclomine (BENTYL) 20 MG tablet TK 1 T PO  TID PRN  6   • fenofibrate micronized (LOFIBRA) 134 MG capsule TK ONE C PO  QD  4   • furosemide (LASIX) 20 MG tablet Take  by " mouth Daily.  5   • gabapentin (NEURONTIN) 300 MG capsule TK ONE C PO  TID  0   • hydrochlorothiazide (HYDRODIURIL) 25 MG tablet Take  by mouth Daily.  5   • HYDROcodone-acetaminophen (NORCO)  MG per tablet TK 1 T PO  BID PRN  0   • HYDROcodone-acetaminophen (NORCO)  MG per tablet Take 1 tablet by mouth Every 6 (Six) Hours As Needed for Moderate Pain . 20 tablet 0   • hydrOXYzine (ATARAX) 25 MG tablet TK 1 T PO  TID  2   • insulin aspart (novoLOG) 100 UNIT/ML injection Inject  under the skin into the appropriate area as directed 3 (Three) Times a Day Before Meals.     • insulin detemir (LEVEMIR) 100 UNIT/ML injection Inject 50 Units under the skin into the appropriate area as directed 2 (Two) Times a Day.     • levothyroxine (SYNTHROID, LEVOTHROID) 50 MCG tablet TK 1 T PO  DAILY  5   • lisinopril (PRINIVIL,ZESTRIL) 5 MG tablet Take  by mouth Daily.  2   • metFORMIN (GLUCOPHAGE) 500 MG tablet Take 500 mg by mouth 2 (Two) Times a Day With Meals.     • Omega-3 Fatty Acids (FISH OIL) 1000 MG capsule capsule TK 1 C PO QID  6   • ondansetron (ZOFRAN) 4 MG tablet Take 1 tablet by mouth Daily As Needed for Nausea or Vomiting. 30 tablet 1   • ONETOUCH VERIO test strip USE TO TEST SUGAR BEFORE MEALS AND AT BEDTIME  5   • oxyCODONE-acetaminophen (PERCOCET)  MG per tablet Take 1 tablet by mouth Every 6 (Six) Hours As Needed for Moderate Pain . 12 tablet 0   • pravastatin (PRAVACHOL) 10 MG tablet TK 1 T PO  QHS  6   • promethazine (PHENERGAN) 25 MG tablet Take 1 tablet by mouth Every 6 (Six) Hours As Needed for Nausea or Vomiting. 21 tablet 2   • tamsulosin (FLOMAX) 0.4 MG capsule 24 hr capsule Take 1 capsule by mouth Every Night. 30 capsule 5   • venlafaxine XR (EFFEXOR-XR) 150 MG 24 hr capsule TK 1 C PO D  4     No current facility-administered medications for this visit.         Allergies:      Allergies   Allergen Reactions   • Tylenol With Codeine #3 [Acetaminophen-Codeine] Arrhythmia   • Azithromycin  Itching        Past Surgical History:     Past Surgical History:   Procedure Laterality Date   • APPENDECTOMY     • CHOLECYSTECTOMY     • HYSTERECTOMY           Social History:     Social History     Socioeconomic History   • Marital status:      Spouse name: Not on file   • Number of children: Not on file   • Years of education: Not on file   • Highest education level: Not on file   Tobacco Use   • Smoking status: Former Smoker   • Smokeless tobacco: Never Used   Substance and Sexual Activity   • Alcohol use: No     Frequency: Never   • Drug use: No   • Sexual activity: Yes     Partners: Male     Birth control/protection: None       Family History:     Family History   Problem Relation Age of Onset   • No Known Problems Father    • No Known Problems Mother        Review of Systems:     Review of Systems   Constitutional: Positive for fatigue. Negative for activity change, chills and fever.   HENT: Negative for congestion, dental problem and drooling.    Eyes: Negative for blurred vision.   Gastrointestinal: Negative for abdominal pain, nausea and vomiting.   Genitourinary: Positive for dysuria, flank pain, frequency, hematuria and urgency. Negative for difficulty urinating, dyspareunia, genital sores, pelvic pain, pelvic pressure, urinary incontinence, vaginal discharge and vaginal pain.   Musculoskeletal: Positive for back pain.   Skin: Positive for pallor.   Neurological: Negative for dizziness, headache and confusion.   Psychiatric/Behavioral: Negative for behavioral problems and decreased concentration.        Physical Exam:     Physical Exam   Constitutional: She is oriented to person, place, and time. She appears well-developed and well-nourished. She appears distressed.   HENT:   Head: Normocephalic and atraumatic.   Eyes: Pupils are equal, round, and reactive to light. EOM are normal.   Neck: Normal range of motion. No tracheal deviation present. No thyromegaly present.   Cardiovascular: Normal rate  and regular rhythm.   No murmur heard.  Pulmonary/Chest: Effort normal and breath sounds normal. No stridor. No respiratory distress. She has no wheezes.   Abdominal: Soft. Bowel sounds are normal. There is tenderness.   Genitourinary: Vagina normal. There is no tenderness on the right labia. There is no tenderness on the left labia. No vaginal discharge found.   Musculoskeletal: Normal range of motion. She exhibits tenderness. She exhibits no edema or deformity.   Neurological: She is alert and oriented to person, place, and time. No cranial nerve deficit or sensory deficit. Coordination normal.   Skin: Skin is warm and dry. No rash noted. No erythema. No pallor.   Psychiatric: She has a normal mood and affect. Her behavior is normal. Judgment and thought content normal.       Procedure:       Assessment/Plan:     Left flank pain/left lower quadrant abdominal pain: Patient reports significant left flank pain and abdominal pain that has been ongoing for 2 weeks.  She missed her scheduled surgery appointment related to weather conditions.    We we both reviewed her KUB, which is basically negative for stones.  It shows no calcifications seen overlying the kidneys, or along the course of the ureters.    We discussed a kidney stone prevention diet to include increasing p.o. fluid intake, to at least 1 to 2 L of water daily.  She is to avoid caffeine products such as cola, coffee, and to avoid soft or soda drinks.  She is to decrease her sodium consumption as in  Fast foods, pavon, salted nuts, canned foods, and smoked/cured foods. She is also to decrease her oxalate consumption, as in spinach, [place] greens, and Rhubarb.  Also important is to decrease protein intake, as in red meats, peanut butter, and also avoid nuts.      We will see patient on As Needed Basis  (PRN)    Patient reports that she is not currently experiencing any symptoms of urinary incontinence.      Patient's Body mass index is 36.71 kg/m². BMI is  above normal parameters. Recommendations include: educational material, exercise counseling and nutrition counseling.    Smoking Cessation Counseling:  Never a smoker.  Patient does not currently use any tobacco products.      Counseling was given to patient for the following topics patient and family education including: Kidney stone prevention diet and impressions as follows: Negative KUB for stones, pain most likely related to chronic back pain. The interim medical history and current results were reviewed.  A treatment plan with follow-up was made for Flank pain,  Lower abdominal pain [R10.30].              This document has been electronically signed by Griselda Cheng-Akwa, APRN December 23, 2019 7:27 PM

## 2020-10-13 ENCOUNTER — HOSPITAL ENCOUNTER (OUTPATIENT)
Dept: GENERAL RADIOLOGY | Facility: HOSPITAL | Age: 55
Discharge: HOME | End: 2020-10-13
Admitting: UROLOGY

## 2020-10-13 ENCOUNTER — OFFICE VISIT (OUTPATIENT)
Dept: UROLOGY | Facility: CLINIC | Age: 55
End: 2020-10-13

## 2020-10-13 VITALS — HEIGHT: 66 IN | BODY MASS INDEX: 36 KG/M2 | TEMPERATURE: 98.5 F | WEIGHT: 224 LBS

## 2020-10-13 DIAGNOSIS — R33.9 INCOMPLETE BLADDER EMPTYING: ICD-10-CM

## 2020-10-13 DIAGNOSIS — N39.0 RECURRENT UTI: Primary | ICD-10-CM

## 2020-10-13 DIAGNOSIS — N20.0 RENAL CALCULI: ICD-10-CM

## 2020-10-13 LAB
BILIRUB BLD-MCNC: NEGATIVE MG/DL
CLARITY, POC: CLEAR
COLOR UR: YELLOW
GLUCOSE UR STRIP-MCNC: NEGATIVE MG/DL
KETONES UR QL: NEGATIVE
LEUKOCYTE EST, POC: NEGATIVE
NITRITE UR-MCNC: NEGATIVE MG/ML
PH UR: 5.5 [PH] (ref 5–8)
PROT UR STRIP-MCNC: ABNORMAL MG/DL
RBC # UR STRIP: NEGATIVE /UL
SP GR UR: 1.03 (ref 1–1.03)
UROBILINOGEN UR QL: NORMAL

## 2020-10-13 PROCEDURE — 99214 OFFICE O/P EST MOD 30 MIN: CPT | Performed by: UROLOGY

## 2020-10-13 PROCEDURE — 74019 RADEX ABDOMEN 2 VIEWS: CPT | Performed by: RADIOLOGY

## 2020-10-13 PROCEDURE — 74018 RADEX ABDOMEN 1 VIEW: CPT

## 2020-10-13 PROCEDURE — 51798 US URINE CAPACITY MEASURE: CPT | Performed by: UROLOGY

## 2020-10-13 RX ORDER — EXENATIDE 2 MG/.85ML
INJECTION, SUSPENSION, EXTENDED RELEASE SUBCUTANEOUS
COMMUNITY
Start: 2020-09-14

## 2020-10-13 RX ORDER — INSULIN LISPRO 100 U/ML
INJECTION, SOLUTION SUBCUTANEOUS
COMMUNITY
Start: 2020-09-12

## 2020-10-13 RX ORDER — HYDROCODONE BITARTRATE AND ACETAMINOPHEN 10; 325 MG/1; MG/1
1 TABLET ORAL EVERY 6 HOURS PRN
Qty: 12 TABLET | Refills: 0 | Status: SHIPPED | OUTPATIENT
Start: 2020-10-13 | End: 2021-11-01

## 2020-10-13 RX ORDER — INSULIN GLARGINE 100 [IU]/ML
50 INJECTION, SOLUTION SUBCUTANEOUS
COMMUNITY
Start: 2020-07-12

## 2020-10-13 NOTE — PROGRESS NOTES
Chief Complaint:          Chief Complaint   Patient presents with   • Recurrent UTI       HPI:   55 y.o. female who is well-known to me and passed a number of stones.  A CT was done in [place] but she did not bring her imaging with her KUB today showed bilateral stones not obvious but very small she is severely constipated.  Sure urinalysis is negative.  Will go ahead and recommend observation I will see her back in 1 week if it persists she will need a repeat CT scan here so I can have it available to review.  Renal calculus-we discussed the presence of the stone we discussed the various therapeutic options available including percutaneous nephrostolithotomy, lithotripsy.  We discussed the risks of lithotripsy including the passage of stones the development of a large string of stones in the distal ureter known as Steinstrasse.  In the 3% incidence of that we will need to proceed with a ureteroscopy for obstructing fragments.  Extremely rare incidence of renal hematoma.  And the significance of this.  We discussed the absolute relative indicators for intervention including the presence of sepsis, and pain we cannot control is the primary need for urgent intervention.  We discussed placement of a stent if indicated and the management of the stent as well.      Past Medical History:        Past Medical History:   Diagnosis Date   • Chronic back pain    • Diabetes mellitus (CMS/HCC)    • Diverticulitis    • Gastritis    • High cholesterol    • Hypertension    • IBS (irritable bowel syndrome)          Current Meds:     Current Outpatient Medications   Medication Sig Dispense Refill   • amitriptyline (ELAVIL) 25 MG tablet TK 1 T PO  HS  5   • atenolol (TENORMIN) 50 MG tablet Take 50 mg by mouth Daily.  5   • B-D ULTRAFINE III SHORT PEN 31G X 8 MM misc U UTD  5   • DEXILANT 30 MG capsule TK 1 C PO QD  5   • dicyclomine (BENTYL) 20 MG tablet TK 1 T PO  TID PRN  6   • fenofibrate micronized (LOFIBRA) 134 MG capsule TK  ONE C PO  QD  4   • furosemide (LASIX) 20 MG tablet Take  by mouth Daily.  5   • gabapentin (NEURONTIN) 300 MG capsule TK ONE C PO  TID  0   • hydrochlorothiazide (HYDRODIURIL) 25 MG tablet Take  by mouth Daily.  5   • HYDROcodone-acetaminophen (NORCO)  MG per tablet TK 1 T PO  BID PRN  0   • HYDROcodone-acetaminophen (NORCO)  MG per tablet Take 1 tablet by mouth Every 6 (Six) Hours As Needed for Moderate Pain . 20 tablet 0   • hydrOXYzine (ATARAX) 25 MG tablet TK 1 T PO  TID  2   • insulin aspart (novoLOG) 100 UNIT/ML injection Inject  under the skin into the appropriate area as directed 3 (Three) Times a Day Before Meals.     • insulin detemir (LEVEMIR) 100 UNIT/ML injection Inject 50 Units under the skin into the appropriate area as directed 2 (Two) Times a Day.     • levothyroxine (SYNTHROID, LEVOTHROID) 50 MCG tablet TK 1 T PO  DAILY  5   • lisinopril (PRINIVIL,ZESTRIL) 5 MG tablet Take  by mouth Daily.  2   • metFORMIN (GLUCOPHAGE) 500 MG tablet Take 500 mg by mouth 2 (Two) Times a Day With Meals.     • Omega-3 Fatty Acids (FISH OIL) 1000 MG capsule capsule TK 1 C PO QID  6   • ondansetron (ZOFRAN) 4 MG tablet Take 1 tablet by mouth Daily As Needed for Nausea or Vomiting. 30 tablet 1   • ONETOUCH VERIO test strip USE TO TEST SUGAR BEFORE MEALS AND AT BEDTIME  5   • oxyCODONE-acetaminophen (PERCOCET)  MG per tablet Take 1 tablet by mouth Every 6 (Six) Hours As Needed for Moderate Pain . 12 tablet 0   • pravastatin (PRAVACHOL) 10 MG tablet TK 1 T PO  QHS  6   • promethazine (PHENERGAN) 25 MG tablet Take 1 tablet by mouth Every 6 (Six) Hours As Needed for Nausea or Vomiting. 21 tablet 2   • tamsulosin (FLOMAX) 0.4 MG capsule 24 hr capsule Take 1 capsule by mouth Every Night. 30 capsule 5   • venlafaxine XR (EFFEXOR-XR) 150 MG 24 hr capsule TK 1 C PO D  4     No current facility-administered medications for this visit.         Allergies:      Allergies   Allergen Reactions   • Tylenol With  Codeine #3 [Acetaminophen-Codeine] Arrhythmia   • Azithromycin Itching        Past Surgical History:     Past Surgical History:   Procedure Laterality Date   • APPENDECTOMY     • CHOLECYSTECTOMY     • HYSTERECTOMY           Social History:     Social History     Socioeconomic History   • Marital status:      Spouse name: Not on file   • Number of children: Not on file   • Years of education: Not on file   • Highest education level: Not on file   Tobacco Use   • Smoking status: Former Smoker   • Smokeless tobacco: Never Used   Substance and Sexual Activity   • Alcohol use: No     Frequency: Never   • Drug use: No   • Sexual activity: Yes     Partners: Male     Birth control/protection: None       Family History:     Family History   Problem Relation Age of Onset   • No Known Problems Father    • No Known Problems Mother        Review of Systems:     Review of Systems   Constitutional: Negative.  Negative for activity change, appetite change, chills, diaphoresis, fatigue and unexpected weight change.   HENT: Negative for congestion, dental problem, drooling, ear discharge, ear pain, facial swelling, hearing loss, mouth sores, nosebleeds, postnasal drip, rhinorrhea, sinus pressure, sneezing, sore throat, tinnitus, trouble swallowing and voice change.    Eyes: Negative.  Negative for photophobia, pain, discharge, redness, itching and visual disturbance.   Respiratory: Negative.  Negative for apnea, cough, choking, chest tightness, shortness of breath, wheezing and stridor.    Cardiovascular: Negative.  Negative for chest pain, palpitations and leg swelling.   Gastrointestinal: Negative.  Negative for abdominal distention, abdominal pain, anal bleeding, blood in stool, constipation, diarrhea, nausea, rectal pain and vomiting.   Endocrine: Negative.  Negative for cold intolerance, heat intolerance, polydipsia, polyphagia and polyuria.   Genitourinary: Positive for flank pain.   Musculoskeletal: Negative for  arthralgias, back pain, gait problem, joint swelling, myalgias, neck pain and neck stiffness.   Skin: Negative.  Negative for color change, pallor, rash and wound.   Allergic/Immunologic: Negative.  Negative for environmental allergies, food allergies and immunocompromised state.   Neurological: Negative.  Negative for dizziness, tremors, seizures, syncope, facial asymmetry, speech difficulty, weakness, light-headedness, numbness and headaches.   Hematological: Negative.  Negative for adenopathy. Does not bruise/bleed easily.   Psychiatric/Behavioral: Negative for agitation, behavioral problems, confusion, decreased concentration, dysphoric mood, hallucinations, self-injury, sleep disturbance and suicidal ideas. The patient is not nervous/anxious and is not hyperactive.    All other systems reviewed and are negative.      Physical Exam:     Physical Exam  Constitutional:       Appearance: She is well-developed.   HENT:      Head: Normocephalic and atraumatic.      Right Ear: External ear normal.      Left Ear: External ear normal.   Eyes:      Conjunctiva/sclera: Conjunctivae normal.      Pupils: Pupils are equal, round, and reactive to light.   Cardiovascular:      Rate and Rhythm: Normal rate and regular rhythm.      Heart sounds: Normal heart sounds.   Pulmonary:      Effort: Pulmonary effort is normal.      Breath sounds: Normal breath sounds.   Abdominal:      General: Bowel sounds are normal. There is no distension.      Palpations: Abdomen is soft. There is no mass.      Tenderness: There is no abdominal tenderness. There is no guarding or rebound.   Genitourinary:     Vagina: No vaginal discharge.   Musculoskeletal: Normal range of motion.   Skin:     General: Skin is warm and dry.   Neurological:      Mental Status: She is alert.      Deep Tendon Reflexes: Reflexes are normal and symmetric.   Psychiatric:         Behavior: Behavior normal.         Thought Content: Thought content normal.         Judgment:  Judgment normal.         I have reviewed the following portions of the patient's history: allergies, current medications, past family history, past medical history, past social history, past surgical history, problem list and ROS and confirm it's accurate.      Procedure:       Assessment/Plan:   Renal calculus-we discussed the presence of the stone we discussed the various therapeutic options available including percutaneous nephrostolithotomy, lithotripsy.  We discussed the risks of lithotripsy including the passage of stones the development of a large string of stones in the distal ureter known as Steinstrasse.  In the 3% incidence of that we will need to proceed with a ureteroscopy for obstructing fragments.  Extremely rare incidence of renal hematoma.  And the significance of this.  We discussed the absolute relative indicators for intervention including the presence of sepsis, and pain we cannot control is the primary need for urgent intervention.  We discussed placement of a stent if indicated and the management of the stent as well.  If she persists with the pain I would repeat the CT scan here   narcotic pain medication-patient has significant acute pain that I believe would be an indication for the use of narcotic pain medication.  I discussed the significant risks of pain medication and the fact that this will be a short only option and I will give her no more than a three-day supply of pain medication.  And I will not plan long-term medication and that this will be sent to a pain clinic if at all becomes necessary.  We discussed signing a pain medication agreement and the fact that we're going to run a state [facility] review to be sure the patient is not getting pain medication from elsewhere.  If this is the case we will not give pain medication.  As part of the patient's treatment plan of there being prescribed a controlled substance with potential for abuse.  This patient has been wait aware of the  appropriate dose of such medications including, the risk for somnolence, limited ability to drive and/or safety and the significant potential for overdose.  It is been made clear that these medications are for the prescribed patient only without concomitant use of alcohol or other sepsis unless prescribed by the medical provider.  Has completed prescribing agreement detailing the terms of continue prescribing him a controlled substance.  Including monitoring Christie ports, the possibility of urine drug screens and pedal counts.  The patient is aware that we reviewed CHRISTIE reports on a regular basis and scan them into the chart.  History and physical examination exhibited continued safe and appropriate use of controlled substances.  Also discussed the fact that the new [place] legislation allows only a three-day prescription for pain medication.  In this situation he will be referred to a chronic pain clinic.            Patient's Body mass index is 36.7 kg/m². BMI is above normal parameters. Recommendations include: educational material.              This document has been electronically signed by ROSALIND WILLIAM MD October 13, 2020 08:09 EDT

## 2020-10-14 PROBLEM — N20.0 RENAL CALCULI: Status: ACTIVE | Noted: 2020-10-14

## 2020-10-14 PROBLEM — N39.0 RECURRENT UTI: Status: ACTIVE | Noted: 2020-10-14

## 2020-10-20 ENCOUNTER — OFFICE VISIT (OUTPATIENT)
Dept: UROLOGY | Facility: CLINIC | Age: 55
End: 2020-10-20

## 2020-10-20 VITALS — BODY MASS INDEX: 36.14 KG/M2 | WEIGHT: 224.87 LBS | HEIGHT: 66 IN | TEMPERATURE: 98.4 F

## 2020-10-20 DIAGNOSIS — N20.0 RENAL CALCULI: Primary | ICD-10-CM

## 2020-10-20 PROCEDURE — 99213 OFFICE O/P EST LOW 20 MIN: CPT | Performed by: UROLOGY

## 2020-10-20 NOTE — PROGRESS NOTES
Chief Complaint:          Chief Complaint   Patient presents with   • Nephrolithiasis     1 week follow up       HPI:   55 y.o. female she returns today having passed stones being taken over by Dr. Silva.  I reviewed the CT scan she brought from [facility] which was negative for urolithiasis at this time I gave her reassurance I will see her back on an as needed basis with the admonition to follow-up here with stones other than her primary care physician and then be referred later with imaging that perhaps is not  appropriate      Past Medical History:        Past Medical History:   Diagnosis Date   • Chronic back pain    • Diabetes mellitus (CMS/HCC)    • Diverticulitis    • Gastritis    • High cholesterol    • Hypertension    • IBS (irritable bowel syndrome)          Current Meds:     Current Outpatient Medications   Medication Sig Dispense Refill   • amitriptyline (ELAVIL) 25 MG tablet TK 1 T PO  HS  5   • atenolol (TENORMIN) 50 MG tablet Take 50 mg by mouth Daily.  5   • B-D ULTRAFINE III SHORT PEN 31G X 8 MM misc U UTD  5   • Bydureon BCise 2 MG/0.85ML auto-injector injection INJECT 1 SYRINGE  UNDER SKIN ONCE A WEEK     • DEXILANT 30 MG capsule TK 1 C PO QD  5   • dicyclomine (BENTYL) 20 MG tablet TK 1 T PO  TID PRN  6   • fenofibrate micronized (LOFIBRA) 134 MG capsule TK ONE C PO  QD  4   • furosemide (LASIX) 20 MG tablet Take  by mouth Daily.  5   • gabapentin (NEURONTIN) 300 MG capsule TK ONE C PO  TID  0   • hydrochlorothiazide (HYDRODIURIL) 25 MG tablet Take  by mouth Daily.  5   • HYDROcodone-acetaminophen (NORCO)  MG per tablet TK 1 T PO  BID PRN  0   • HYDROcodone-acetaminophen (NORCO)  MG per tablet Take 1 tablet by mouth Every 6 (Six) Hours As Needed for Moderate Pain . 20 tablet 0   • HYDROcodone-acetaminophen (Norco)  MG per tablet Take 1 tablet by mouth Every 6 (Six) Hours As Needed for Moderate Pain . 12 tablet 0   • hydrOXYzine (ATARAX) 25 MG tablet TK 1 T PO  TID  2   • insulin  aspart (novoLOG) 100 UNIT/ML injection Inject  under the skin into the appropriate area as directed 3 (Three) Times a Day Before Meals.     • insulin detemir (LEVEMIR) 100 UNIT/ML injection Inject 50 Units under the skin into the appropriate area as directed 2 (Two) Times a Day.     • Insulin Glargine (BASAGLAR KWIKPEN) 100 UNIT/ML injection pen 50 Units.     • Insulin Lispro (ADMELOG SOLOSTAR) 100 UNIT/ML injection pen INJECT 25 UNITS UNDER SKIN TID     • levothyroxine (SYNTHROID, LEVOTHROID) 50 MCG tablet TK 1 T PO  DAILY  5   • lisinopril (PRINIVIL,ZESTRIL) 5 MG tablet Take  by mouth Daily.  2   • metFORMIN (GLUCOPHAGE) 500 MG tablet Take 500 mg by mouth 2 (Two) Times a Day With Meals.     • Omega-3 Fatty Acids (FISH OIL) 1000 MG capsule capsule TK 1 C PO QID  6   • ondansetron (ZOFRAN) 4 MG tablet Take 1 tablet by mouth Daily As Needed for Nausea or Vomiting. 30 tablet 1   • ONETOUCH VERIO test strip USE TO TEST SUGAR BEFORE MEALS AND AT BEDTIME  5   • oxyCODONE-acetaminophen (PERCOCET)  MG per tablet Take 1 tablet by mouth Every 6 (Six) Hours As Needed for Moderate Pain . 12 tablet 0   • pravastatin (PRAVACHOL) 10 MG tablet TK 1 T PO  QHS  6   • promethazine (PHENERGAN) 25 MG tablet Take 1 tablet by mouth Every 6 (Six) Hours As Needed for Nausea or Vomiting. 21 tablet 2   • tamsulosin (FLOMAX) 0.4 MG capsule 24 hr capsule Take 1 capsule by mouth Every Night. 30 capsule 5   • venlafaxine XR (EFFEXOR-XR) 150 MG 24 hr capsule TK 1 C PO D  4     No current facility-administered medications for this visit.         Allergies:      Allergies   Allergen Reactions   • Tylenol With Codeine #3 [Acetaminophen-Codeine] Arrhythmia   • Azithromycin Itching        Past Surgical History:     Past Surgical History:   Procedure Laterality Date   • APPENDECTOMY     • CHOLECYSTECTOMY     • HYSTERECTOMY           Social History:     Social History     Socioeconomic History   • Marital status:      Spouse name: Not on  file   • Number of children: Not on file   • Years of education: Not on file   • Highest education level: Not on file   Tobacco Use   • Smoking status: Former Smoker   • Smokeless tobacco: Never Used   Substance and Sexual Activity   • Alcohol use: No     Frequency: Never   • Drug use: No   • Sexual activity: Yes     Partners: Male     Birth control/protection: None       Family History:     Family History   Problem Relation Age of Onset   • No Known Problems Father    • No Known Problems Mother        Review of Systems:     Review of Systems   Constitutional: Negative.  Negative for activity change, appetite change, chills, diaphoresis, fatigue and unexpected weight change.   HENT: Negative for congestion, dental problem, drooling, ear discharge, ear pain, facial swelling, hearing loss, mouth sores, nosebleeds, postnasal drip, rhinorrhea, sinus pressure, sneezing, sore throat, tinnitus, trouble swallowing and voice change.    Eyes: Negative.  Negative for photophobia, pain, discharge, redness, itching and visual disturbance.   Respiratory: Negative.  Negative for apnea, cough, choking, chest tightness, shortness of breath, wheezing and stridor.    Cardiovascular: Negative.  Negative for chest pain, palpitations and leg swelling.   Gastrointestinal: Negative.  Negative for abdominal distention, abdominal pain, anal bleeding, blood in stool, constipation, diarrhea, nausea, rectal pain and vomiting.   Endocrine: Negative.  Negative for cold intolerance, heat intolerance, polydipsia, polyphagia and polyuria.   Musculoskeletal: Negative.  Negative for arthralgias, back pain, gait problem, joint swelling, myalgias, neck pain and neck stiffness.   Skin: Negative.  Negative for color change, pallor, rash and wound.   Allergic/Immunologic: Negative.  Negative for environmental allergies, food allergies and immunocompromised state.   Neurological: Negative.  Negative for dizziness, tremors, seizures, syncope, facial  asymmetry, speech difficulty, weakness, light-headedness, numbness and headaches.   Hematological: Negative.  Negative for adenopathy. Does not bruise/bleed easily.   Psychiatric/Behavioral: Negative for agitation, behavioral problems, confusion, decreased concentration, dysphoric mood, hallucinations, self-injury, sleep disturbance and suicidal ideas. The patient is not nervous/anxious and is not hyperactive.    All other systems reviewed and are negative.      Physical Exam:     Physical Exam  Constitutional:       Appearance: She is well-developed.   HENT:      Head: Normocephalic and atraumatic.      Right Ear: External ear normal.      Left Ear: External ear normal.   Eyes:      Conjunctiva/sclera: Conjunctivae normal.      Pupils: Pupils are equal, round, and reactive to light.   Cardiovascular:      Rate and Rhythm: Normal rate and regular rhythm.      Heart sounds: Normal heart sounds.   Pulmonary:      Effort: Pulmonary effort is normal.      Breath sounds: Normal breath sounds.   Abdominal:      General: Bowel sounds are normal. There is no distension.      Palpations: Abdomen is soft. There is no mass.      Tenderness: There is no abdominal tenderness. There is no guarding or rebound.   Genitourinary:     Vagina: No vaginal discharge.   Musculoskeletal: Normal range of motion.   Skin:     General: Skin is warm and dry.   Neurological:      Mental Status: She is alert.      Deep Tendon Reflexes: Reflexes are normal and symmetric.   Psychiatric:         Behavior: Behavior normal.         Thought Content: Thought content normal.         Judgment: Judgment normal.         I have reviewed the following portions of the patient's history: allergies, current medications, past family history, past medical history, past social history, past surgical history, problem list and ROS and confirm it's accurate.      Procedure:       Assessment/Plan:   Renal calculus-we discussed the presence of the stone we discussed the  various therapeutic options available including percutaneous nephrostolithotomy, lithotripsy.  We discussed the risks of lithotripsy including the passage of stones the development of a large string of stones in the distal ureter known as Steinstrasse.  In the 3% incidence of that we will need to proceed with a ureteroscopy for obstructing fragments.  Extremely rare incidence of renal hematoma.  And the significance of this.  We discussed the absolute relative indicators for intervention including the presence of sepsis, and pain we cannot control is the primary need for urgent intervention.  We discussed placement of a stent if indicated and the management of the stent as well.  She is currently stone free            Patient's Body mass index is 36.84 kg/m². BMI is above normal parameters. Recommendations include: educational material.              This document has been electronically signed by ROSALIND WILLIAM MD October 20, 2020 08:02 EDT

## 2021-03-23 ENCOUNTER — BULK ORDERING (OUTPATIENT)
Dept: CASE MANAGEMENT | Facility: OTHER | Age: 56
End: 2021-03-23

## 2021-03-23 DIAGNOSIS — Z23 IMMUNIZATION DUE: ICD-10-CM

## 2021-04-09 ENCOUNTER — OFFICE VISIT (OUTPATIENT)
Dept: UROLOGY | Facility: CLINIC | Age: 56
End: 2021-04-09

## 2021-04-09 ENCOUNTER — HOSPITAL ENCOUNTER (OUTPATIENT)
Dept: GENERAL RADIOLOGY | Facility: HOSPITAL | Age: 56
Discharge: HOME | End: 2021-04-09
Admitting: UROLOGY

## 2021-04-09 VITALS — TEMPERATURE: 98.1 F | BODY MASS INDEX: 36.14 KG/M2 | HEIGHT: 66 IN | WEIGHT: 224.87 LBS

## 2021-04-09 DIAGNOSIS — E83.52 HYPERCALCEMIA: ICD-10-CM

## 2021-04-09 DIAGNOSIS — N20.0 RENAL CALCULI: ICD-10-CM

## 2021-04-09 DIAGNOSIS — N20.0 RENAL CALCULI: Primary | ICD-10-CM

## 2021-04-09 PROCEDURE — 99214 OFFICE O/P EST MOD 30 MIN: CPT | Performed by: UROLOGY

## 2021-04-09 PROCEDURE — 74018 RADEX ABDOMEN 1 VIEW: CPT

## 2021-04-09 PROCEDURE — 74018 RADEX ABDOMEN 1 VIEW: CPT | Performed by: RADIOLOGY

## 2021-04-09 RX ORDER — HYDROCODONE BITARTRATE AND ACETAMINOPHEN 10; 325 MG/1; MG/1
12 TABLET ORAL EVERY 6 HOURS PRN
Qty: 12 TABLET | Refills: 0 | Status: SHIPPED | OUTPATIENT
Start: 2021-04-09 | End: 2021-11-01

## 2021-04-09 NOTE — PROGRESS NOTES
Chief Complaint:          Chief Complaint   Patient presents with   • Nephrolithiasis       HPI:   56 y.o. female referral known to me with a history of recurrent stone she is follows with Dr. Silva she passed a stone last week she was told she had hypercalcemia.  KUB today was uncontributory for any radio opaque stones along the course of the urinary tract but she is having substantial flank pain on the left side this is consistent with passage of a recent stone.  She will need a work-up for hypercalcemia.  Apparently she is no longer going to go back to Dr. Silva.  As he lost his prescribing license      Past Medical History:        Past Medical History:   Diagnosis Date   • Chronic back pain    • Diabetes mellitus (CMS/HCC)    • Diverticulitis    • Gastritis    • High cholesterol    • Hypertension    • IBS (irritable bowel syndrome)          Current Meds:     Current Outpatient Medications   Medication Sig Dispense Refill   • amitriptyline (ELAVIL) 25 MG tablet TK 1 T PO  HS  5   • atenolol (TENORMIN) 50 MG tablet Take 50 mg by mouth Daily.  5   • B-D ULTRAFINE III SHORT PEN 31G X 8 MM misc U UTD  5   • Bydureon BCise 2 MG/0.85ML auto-injector injection INJECT 1 SYRINGE  UNDER SKIN ONCE A WEEK     • DEXILANT 30 MG capsule TK 1 C PO QD  5   • dicyclomine (BENTYL) 20 MG tablet TK 1 T PO  TID PRN  6   • fenofibrate micronized (LOFIBRA) 134 MG capsule TK ONE C PO  QD  4   • furosemide (LASIX) 20 MG tablet Take  by mouth Daily.  5   • gabapentin (NEURONTIN) 300 MG capsule TK ONE C PO  TID  0   • hydrochlorothiazide (HYDRODIURIL) 25 MG tablet Take  by mouth Daily.  5   • HYDROcodone-acetaminophen (NORCO)  MG per tablet TK 1 T PO  BID PRN  0   • HYDROcodone-acetaminophen (NORCO)  MG per tablet Take 1 tablet by mouth Every 6 (Six) Hours As Needed for Moderate Pain . 20 tablet 0   • HYDROcodone-acetaminophen (Norco)  MG per tablet Take 1 tablet by mouth Every 6 (Six) Hours As Needed for Moderate Pain .  12 tablet 0   • hydrOXYzine (ATARAX) 25 MG tablet TK 1 T PO  TID  2   • insulin aspart (novoLOG) 100 UNIT/ML injection Inject  under the skin into the appropriate area as directed 3 (Three) Times a Day Before Meals.     • insulin detemir (LEVEMIR) 100 UNIT/ML injection Inject 50 Units under the skin into the appropriate area as directed 2 (Two) Times a Day.     • Insulin Glargine (BASAGLAR KWIKPEN) 100 UNIT/ML injection pen 50 Units.     • Insulin Lispro (ADMELOG SOLOSTAR) 100 UNIT/ML injection pen INJECT 25 UNITS UNDER SKIN TID     • levothyroxine (SYNTHROID, LEVOTHROID) 50 MCG tablet TK 1 T PO  DAILY  5   • lisinopril (PRINIVIL,ZESTRIL) 5 MG tablet Take  by mouth Daily.  2   • metFORMIN (GLUCOPHAGE) 500 MG tablet Take 500 mg by mouth 2 (Two) Times a Day With Meals.     • Omega-3 Fatty Acids (FISH OIL) 1000 MG capsule capsule TK 1 C PO QID  6   • ondansetron (ZOFRAN) 4 MG tablet Take 1 tablet by mouth Daily As Needed for Nausea or Vomiting. 30 tablet 1   • ONETOUCH VERIO test strip USE TO TEST SUGAR BEFORE MEALS AND AT BEDTIME  5   • oxyCODONE-acetaminophen (PERCOCET)  MG per tablet Take 1 tablet by mouth Every 6 (Six) Hours As Needed for Moderate Pain . 12 tablet 0   • pravastatin (PRAVACHOL) 10 MG tablet TK 1 T PO  QHS  6   • promethazine (PHENERGAN) 25 MG tablet Take 1 tablet by mouth Every 6 (Six) Hours As Needed for Nausea or Vomiting. 21 tablet 2   • tamsulosin (FLOMAX) 0.4 MG capsule 24 hr capsule Take 1 capsule by mouth Every Night. 30 capsule 5   • venlafaxine XR (EFFEXOR-XR) 150 MG 24 hr capsule TK 1 C PO D  4     No current facility-administered medications for this visit.        Allergies:      Allergies   Allergen Reactions   • Tylenol With Codeine #3 [Acetaminophen-Codeine] Arrhythmia   • Azithromycin Itching        Past Surgical History:     Past Surgical History:   Procedure Laterality Date   • APPENDECTOMY     • CHOLECYSTECTOMY     • HYSTERECTOMY           Social History:     Social History      Socioeconomic History   • Marital status:      Spouse name: Not on file   • Number of children: Not on file   • Years of education: Not on file   • Highest education level: Not on file   Tobacco Use   • Smoking status: Former Smoker   • Smokeless tobacco: Never Used   Substance and Sexual Activity   • Alcohol use: No   • Drug use: No   • Sexual activity: Yes     Partners: Male     Birth control/protection: None       Family History:     Family History   Problem Relation Age of Onset   • No Known Problems Father    • No Known Problems Mother        Review of Systems:     Review of Systems   Constitutional: Negative.  Negative for activity change, appetite change, chills, diaphoresis, fatigue and unexpected weight change.   HENT: Negative for congestion, dental problem, drooling, ear discharge, ear pain, facial swelling, hearing loss, mouth sores, nosebleeds, postnasal drip, rhinorrhea, sinus pressure, sneezing, sore throat, tinnitus, trouble swallowing and voice change.    Eyes: Negative.  Negative for photophobia, pain, discharge, redness, itching and visual disturbance.   Respiratory: Negative.  Negative for apnea, cough, choking, chest tightness, shortness of breath, wheezing and stridor.    Cardiovascular: Negative.  Negative for chest pain, palpitations and leg swelling.   Gastrointestinal: Negative.  Negative for abdominal distention, abdominal pain, anal bleeding, blood in stool, constipation, diarrhea, nausea, rectal pain and vomiting.   Endocrine: Negative.  Negative for cold intolerance, heat intolerance, polydipsia, polyphagia and polyuria.   Genitourinary: Positive for flank pain.   Musculoskeletal: Negative for arthralgias, back pain, gait problem, joint swelling, myalgias, neck pain and neck stiffness.   Skin: Negative.  Negative for color change, pallor, rash and wound.   Allergic/Immunologic: Negative.  Negative for environmental allergies, food allergies and immunocompromised state.    Neurological: Negative.  Negative for dizziness, tremors, seizures, syncope, facial asymmetry, speech difficulty, weakness, light-headedness, numbness and headaches.   Hematological: Negative.  Negative for adenopathy. Does not bruise/bleed easily.   Psychiatric/Behavioral: Negative for agitation, behavioral problems, confusion, decreased concentration, dysphoric mood, hallucinations, self-injury, sleep disturbance and suicidal ideas. The patient is not nervous/anxious and is not hyperactive.    All other systems reviewed and are negative.      Physical Exam:     Physical Exam  Constitutional:       Appearance: She is well-developed.   HENT:      Head: Normocephalic and atraumatic.      Right Ear: External ear normal.      Left Ear: External ear normal.   Eyes:      Conjunctiva/sclera: Conjunctivae normal.      Pupils: Pupils are equal, round, and reactive to light.   Cardiovascular:      Rate and Rhythm: Normal rate and regular rhythm.      Heart sounds: Normal heart sounds.   Pulmonary:      Effort: Pulmonary effort is normal.      Breath sounds: Normal breath sounds.   Abdominal:      General: Bowel sounds are normal. There is no distension.      Palpations: Abdomen is soft. There is no mass.      Tenderness: There is no abdominal tenderness. There is no guarding or rebound.   Genitourinary:     Vagina: No vaginal discharge.   Musculoskeletal:         General: Normal range of motion.   Skin:     General: Skin is warm and dry.   Neurological:      Mental Status: She is alert.      Deep Tendon Reflexes: Reflexes are normal and symmetric.   Psychiatric:         Behavior: Behavior normal.         Thought Content: Thought content normal.         Judgment: Judgment normal.         I have reviewed the following portions of the patient's history: allergies, current medications, past family history, past medical history, past social history, past surgical history, problem list and ROS and confirm it's  accurate.      Procedure:       Assessment/Plan:   Renal calculus-we discussed the presence of the stone we discussed the various therapeutic options available including percutaneous nephrostolithotomy, lithotripsy.  We discussed the risks of lithotripsy including the passage of stones the development of a large string of stones in the distal ureter known as Steinstrasse.  In the 3% incidence of that we will need to proceed with a ureteroscopy for obstructing fragments.  Extremely rare incidence of renal hematoma.  And the significance of this.  We discussed the absolute relative indicators for intervention including the presence of sepsis, and pain we cannot control is the primary need for urgent intervention.  We discussed placement of a stent if indicated and the management of the stent as well.  He has a little left-sided flank pain.  She passed a stone.  I will obtain a stone CT  Hypercalcemia-not had a work-up as yet obvious [place] would include hyperparathyroidism.  She will need a work-up for this after I get the results of the CT  Narcotic pain medication-patient has significant acute pain that I believe would be an indication for the use of narcotic pain medication.  I discussed the significant risks of pain medication and the fact that this will be a short only option and I will give her no more than a three-day supply of pain medication.  And I will not plan long-term medication and that this will be sent to a pain clinic if at all becomes necessary.  We discussed signing a pain medication agreement and the fact that we're going to run a state [facility] review to be sure the patient is not getting pain medication from elsewhere.  If this is the case we will not give pain medication.  As part of the patient's treatment plan of there being prescribed a controlled substance with potential for abuse.  This patient has been wait aware of the appropriate dose of such medications including, the risk for  somnolence, limited ability to drive and/or safety and the significant potential for overdose.  It is been made clear that these medications are for the prescribed patient only without concomitant use of alcohol or other sepsis unless prescribed by the medical provider.  Has completed prescribing agreement detailing the terms of continue prescribing him a controlled substance.  Including monitoring Christie ports, the possibility of urine drug screens and pedal counts.  The patient is aware that we reviewed CHRISTIE reports on a regular basis and scan them into the chart.  History and physical examination exhibited continued safe and appropriate use of controlled substances.  Also discussed the fact that the new [place] legislation allows only a three-day prescription for pain medication.  In this situation he will be referred to a chronic pain clinic.            Patient's Body mass index is 36.84 kg/m². BMI is above normal parameters. Recommendations include: educational material.              This document has been electronically signed by ROSALIND WILLIAM MD April 9, 2021 12:41 EDT

## 2021-04-11 PROBLEM — E83.52 HYPERCALCEMIA: Status: ACTIVE | Noted: 2021-04-11

## 2021-04-23 ENCOUNTER — APPOINTMENT (OUTPATIENT)
Dept: CT IMAGING | Facility: HOSPITAL | Age: 56
End: 2021-04-23

## 2021-04-30 ENCOUNTER — TELEPHONE (OUTPATIENT)
Dept: UROLOGY | Facility: CLINIC | Age: 56
End: 2021-04-30

## 2021-05-04 ENCOUNTER — HOSPITAL ENCOUNTER (OUTPATIENT)
Dept: CT IMAGING | Facility: HOSPITAL | Age: 56
Discharge: HOME | End: 2021-05-04
Admitting: UROLOGY

## 2021-05-04 DIAGNOSIS — N20.0 RENAL CALCULI: ICD-10-CM

## 2021-05-04 PROCEDURE — 74176 CT ABD & PELVIS W/O CONTRAST: CPT | Performed by: RADIOLOGY

## 2021-05-04 PROCEDURE — 74176 CT ABD & PELVIS W/O CONTRAST: CPT

## 2021-05-06 ENCOUNTER — OFFICE VISIT (OUTPATIENT)
Dept: UROLOGY | Facility: CLINIC | Age: 56
End: 2021-05-06

## 2021-05-06 VITALS — BODY MASS INDEX: 36.14 KG/M2 | HEIGHT: 66 IN | TEMPERATURE: 98.1 F | WEIGHT: 224.87 LBS

## 2021-05-06 DIAGNOSIS — N20.1 LEFT URETERAL CALCULUS: ICD-10-CM

## 2021-05-06 DIAGNOSIS — R10.9 FLANK PAIN: ICD-10-CM

## 2021-05-06 DIAGNOSIS — N20.0 RENAL CALCULI: Primary | ICD-10-CM

## 2021-05-06 PROCEDURE — 99214 OFFICE O/P EST MOD 30 MIN: CPT | Performed by: UROLOGY

## 2021-05-06 RX ORDER — TAMSULOSIN HYDROCHLORIDE 0.4 MG/1
1 CAPSULE ORAL NIGHTLY
Qty: 30 CAPSULE | Refills: 5 | Status: SHIPPED | OUTPATIENT
Start: 2021-05-06

## 2021-05-06 RX ORDER — HYDROCODONE BITARTRATE AND ACETAMINOPHEN 10; 325 MG/1; MG/1
1 TABLET ORAL EVERY 6 HOURS PRN
Qty: 16 TABLET | Refills: 0 | Status: SHIPPED | OUTPATIENT
Start: 2021-05-06 | End: 2021-11-01

## 2021-05-06 NOTE — PROGRESS NOTES
Chief Complaint:          Chief Complaint   Patient presents with   • review ct       HPI:   56 y.o. female well-known to me returns today I reviewed her CT she has a 1.5 mm left lower pole stone she is passed 3 stones she is recently lost 38 pounds we discussed stone prevention.  Since she passed the stones yesterday is one other smaller stone the past as documented we will give her one short course of pain medication have available I will see her back in 3 months      Past Medical History:        Past Medical History:   Diagnosis Date   • Chronic back pain    • Diabetes mellitus (CMS/HCC)    • Diverticulitis    • Gastritis    • High cholesterol    • Hypertension    • IBS (irritable bowel syndrome)          Current Meds:     Current Outpatient Medications   Medication Sig Dispense Refill   • amitriptyline (ELAVIL) 25 MG tablet TK 1 T PO  HS  5   • atenolol (TENORMIN) 50 MG tablet Take 50 mg by mouth Daily.  5   • B-D ULTRAFINE III SHORT PEN 31G X 8 MM misc U UTD  5   • Bydureon BCise 2 MG/0.85ML auto-injector injection INJECT 1 SYRINGE  UNDER SKIN ONCE A WEEK     • DEXILANT 30 MG capsule TK 1 C PO QD  5   • dicyclomine (BENTYL) 20 MG tablet TK 1 T PO  TID PRN  6   • fenofibrate micronized (LOFIBRA) 134 MG capsule TK ONE C PO  QD  4   • furosemide (LASIX) 20 MG tablet Take  by mouth Daily.  5   • gabapentin (NEURONTIN) 300 MG capsule TK ONE C PO  TID  0   • hydrochlorothiazide (HYDRODIURIL) 25 MG tablet Take  by mouth Daily.  5   • HYDROcodone-acetaminophen (NORCO)  MG per tablet TK 1 T PO  BID PRN  0   • HYDROcodone-acetaminophen (NORCO)  MG per tablet Take 1 tablet by mouth Every 6 (Six) Hours As Needed for Moderate Pain . 20 tablet 0   • HYDROcodone-acetaminophen (Norco)  MG per tablet Take 1 tablet by mouth Every 6 (Six) Hours As Needed for Moderate Pain . 12 tablet 0   • HYDROcodone-acetaminophen (Norco)  MG per tablet Take 12 tablets by mouth Every 6 (Six) Hours As Needed for Moderate  Pain . 12 tablet 0   • hydrOXYzine (ATARAX) 25 MG tablet TK 1 T PO  TID  2   • insulin aspart (novoLOG) 100 UNIT/ML injection Inject  under the skin into the appropriate area as directed 3 (Three) Times a Day Before Meals.     • insulin detemir (LEVEMIR) 100 UNIT/ML injection Inject 50 Units under the skin into the appropriate area as directed 2 (Two) Times a Day.     • Insulin Glargine (BASAGLAR KWIKPEN) 100 UNIT/ML injection pen 50 Units.     • Insulin Lispro (ADMELOG SOLOSTAR) 100 UNIT/ML injection pen INJECT 25 UNITS UNDER SKIN TID     • levothyroxine (SYNTHROID, LEVOTHROID) 50 MCG tablet TK 1 T PO  DAILY  5   • lisinopril (PRINIVIL,ZESTRIL) 5 MG tablet Take  by mouth Daily.  2   • metFORMIN (GLUCOPHAGE) 500 MG tablet Take 500 mg by mouth 2 (Two) Times a Day With Meals.     • Omega-3 Fatty Acids (FISH OIL) 1000 MG capsule capsule TK 1 C PO QID  6   • ondansetron (ZOFRAN) 4 MG tablet Take 1 tablet by mouth Daily As Needed for Nausea or Vomiting. 30 tablet 1   • ONETOUCH VERIO test strip USE TO TEST SUGAR BEFORE MEALS AND AT BEDTIME  5   • oxyCODONE-acetaminophen (PERCOCET)  MG per tablet Take 1 tablet by mouth Every 6 (Six) Hours As Needed for Moderate Pain . 12 tablet 0   • pravastatin (PRAVACHOL) 10 MG tablet TK 1 T PO  QHS  6   • promethazine (PHENERGAN) 25 MG tablet Take 1 tablet by mouth Every 6 (Six) Hours As Needed for Nausea or Vomiting. 21 tablet 2   • tamsulosin (FLOMAX) 0.4 MG capsule 24 hr capsule Take 1 capsule by mouth Every Night. 30 capsule 5   • venlafaxine XR (EFFEXOR-XR) 150 MG 24 hr capsule TK 1 C PO D  4     No current facility-administered medications for this visit.        Allergies:      Allergies   Allergen Reactions   • Tylenol With Codeine #3 [Acetaminophen-Codeine] Arrhythmia   • Azithromycin Itching        Past Surgical History:     Past Surgical History:   Procedure Laterality Date   • APPENDECTOMY     • CHOLECYSTECTOMY     • HYSTERECTOMY           Social History:     Social  History     Socioeconomic History   • Marital status:      Spouse name: Not on file   • Number of children: Not on file   • Years of education: Not on file   • Highest education level: Not on file   Tobacco Use   • Smoking status: Former Smoker   • Smokeless tobacco: Never Used   Substance and Sexual Activity   • Alcohol use: No   • Drug use: No   • Sexual activity: Yes     Partners: Male     Birth control/protection: None       Family History:     Family History   Problem Relation Age of Onset   • No Known Problems Father    • No Known Problems Mother        Review of Systems:     Review of Systems   Constitutional: Negative.  Negative for activity change, appetite change, chills, diaphoresis, fatigue and unexpected weight change.   HENT: Negative for congestion, dental problem, drooling, ear discharge, ear pain, facial swelling, hearing loss, mouth sores, nosebleeds, postnasal drip, rhinorrhea, sinus pressure, sneezing, sore throat, tinnitus, trouble swallowing and voice change.    Eyes: Negative.  Negative for photophobia, pain, discharge, redness, itching and visual disturbance.   Respiratory: Negative.  Negative for apnea, cough, choking, chest tightness, shortness of breath, wheezing and stridor.    Cardiovascular: Negative.  Negative for chest pain, palpitations and leg swelling.   Gastrointestinal: Negative.  Negative for abdominal distention, abdominal pain, anal bleeding, blood in stool, constipation, diarrhea, nausea, rectal pain and vomiting.   Endocrine: Negative.  Negative for cold intolerance, heat intolerance, polydipsia, polyphagia and polyuria.   Genitourinary: Positive for flank pain and frequency.   Musculoskeletal: Negative for arthralgias, back pain, gait problem, joint swelling, myalgias, neck pain and neck stiffness.   Skin: Negative.  Negative for color change, pallor, rash and wound.   Allergic/Immunologic: Negative.  Negative for environmental allergies, food allergies and  immunocompromised state.   Neurological: Negative.  Negative for dizziness, tremors, seizures, syncope, facial asymmetry, speech difficulty, weakness, light-headedness, numbness and headaches.   Hematological: Negative.  Negative for adenopathy. Does not bruise/bleed easily.   Psychiatric/Behavioral: Negative for agitation, behavioral problems, confusion, decreased concentration, dysphoric mood, hallucinations, self-injury, sleep disturbance and suicidal ideas. The patient is not nervous/anxious and is not hyperactive.    All other systems reviewed and are negative.      Physical Exam:     Physical Exam  Constitutional:       Appearance: She is well-developed.   HENT:      Head: Normocephalic and atraumatic.      Right Ear: External ear normal.      Left Ear: External ear normal.   Eyes:      Conjunctiva/sclera: Conjunctivae normal.      Pupils: Pupils are equal, round, and reactive to light.   Cardiovascular:      Rate and Rhythm: Normal rate and regular rhythm.      Heart sounds: Normal heart sounds.   Pulmonary:      Effort: Pulmonary effort is normal.      Breath sounds: Normal breath sounds.   Abdominal:      General: Bowel sounds are normal. There is no distension.      Palpations: Abdomen is soft. There is no mass.      Tenderness: There is no abdominal tenderness. There is no guarding or rebound.   Genitourinary:     Vagina: No vaginal discharge.   Musculoskeletal:         General: Normal range of motion.   Skin:     General: Skin is warm and dry.   Neurological:      Mental Status: She is alert.      Deep Tendon Reflexes: Reflexes are normal and symmetric.   Psychiatric:         Behavior: Behavior normal.         Thought Content: Thought content normal.         Judgment: Judgment normal.         I have reviewed the following portions of the patient's history: allergies, current medications, past family history, past medical history, past social history, past surgical history, problem list and ROS and  confirm it's accurate.      Procedure:       Assessment/Plan:   Renal calculus-we discussed the presence of the stone we discussed the various therapeutic options available including percutaneous nephrostolithotomy, lithotripsy.  We discussed the risks of lithotripsy including the passage of stones the development of a large string of stones in the distal ureter known as Steinstrasse.  In the 3% incidence of that we will need to proceed with a ureteroscopy for obstructing fragments.  Extremely rare incidence of renal hematoma.  And the significance of this.  We discussed the absolute relative indicators for intervention including the presence of sepsis, and pain we cannot control is the primary need for urgent intervention.  We discussed placement of a stent if indicated and the management of the stent as well.  As a documented albeit small stone but causing pain  Narcotic pain medication-patient has significant acute pain that I believe would be an indication for the use of narcotic pain medication.  I discussed the significant risks of pain medication and the fact that this will be a short only option and I will give her no more than a three-day supply of pain medication.  And I will not plan long-term medication and that this will be sent to a pain clinic if at all becomes necessary.  We discussed signing a pain medication agreement and the fact that we're going to run a state [facility] review to be sure the patient is not getting pain medication from elsewhere.  If this is the case we will not give pain medication.  As part of the patient's treatment plan of there being prescribed a controlled substance with potential for abuse.  This patient has been wait aware of the appropriate dose of such medications including, the risk for somnolence, limited ability to drive and/or safety and the significant potential for overdose.  It is been made clear that these medications are for the prescribed patient only without  concomitant use of alcohol or other sepsis unless prescribed by the medical provider.  Has completed prescribing agreement detailing the terms of continue prescribing him a controlled substance.  Including monitoring Christie ports, the possibility of urine drug screens and pedal counts.  The patient is aware that we reviewed CHRISTIE reports on a regular basis and scan them into the chart.  History and physical examination exhibited continued safe and appropriate use of controlled substances.  Also discussed the fact that the new [place] legislation allows only a three-day prescription for pain medication.  In this situation he will be referred to a chronic pain clinic.                This document has been electronically signed by ROSALIND WILLIAM MD May 6, 2021 12:54 EDT

## 2021-08-10 DIAGNOSIS — M25.522 ELBOW PAIN, LEFT: Primary | ICD-10-CM

## 2021-08-18 ENCOUNTER — HOSPITAL ENCOUNTER (OUTPATIENT)
Dept: GENERAL RADIOLOGY | Facility: HOSPITAL | Age: 56
Discharge: HOME | End: 2021-08-18
Admitting: ORTHOPAEDIC SURGERY

## 2021-08-18 ENCOUNTER — OFFICE VISIT (OUTPATIENT)
Dept: ORTHOPEDIC SURGERY | Facility: CLINIC | Age: 56
End: 2021-08-18

## 2021-08-18 VITALS
HEART RATE: 91 BPM | HEIGHT: 64 IN | DIASTOLIC BLOOD PRESSURE: 81 MMHG | WEIGHT: 208.2 LBS | SYSTOLIC BLOOD PRESSURE: 144 MMHG | BODY MASS INDEX: 35.55 KG/M2

## 2021-08-18 DIAGNOSIS — M25.522 ELBOW PAIN, LEFT: ICD-10-CM

## 2021-08-18 DIAGNOSIS — M77.12 LEFT LATERAL EPICONDYLITIS: Primary | ICD-10-CM

## 2021-08-18 PROCEDURE — 20550 NJX 1 TENDON SHEATH/LIGAMENT: CPT | Performed by: ORTHOPAEDIC SURGERY

## 2021-08-18 PROCEDURE — 99203 OFFICE O/P NEW LOW 30 MIN: CPT | Performed by: ORTHOPAEDIC SURGERY

## 2021-08-18 PROCEDURE — 73080 X-RAY EXAM OF ELBOW: CPT | Performed by: RADIOLOGY

## 2021-08-18 PROCEDURE — 73080 X-RAY EXAM OF ELBOW: CPT

## 2021-08-18 RX ORDER — ASPIRIN 81 MG/1
81 TABLET ORAL DAILY
COMMUNITY

## 2021-08-18 RX ADMIN — LIDOCAINE HYDROCHLORIDE 5 ML: 20 INJECTION, SOLUTION INFILTRATION; PERINEURAL at 12:09

## 2021-08-18 RX ADMIN — METHYLPREDNISOLONE ACETATE 40 MG: 40 INJECTION, SUSPENSION INTRA-ARTICULAR; INTRALESIONAL; INTRAMUSCULAR; SOFT TISSUE at 12:09

## 2021-08-18 NOTE — PROGRESS NOTES
New Patient Visit      Patient: Dari Adams  YOB: 1965  Date of Encounter: 08/18/2021        Chief Complaint:   Chief Complaint   Patient presents with   • Left Elbow - Pain, Initial Evaluation           HPI:   Dari Adams, 56 y.o. female, referred by Celeste Silva MD presents for evaluation of left elbow pain she lates this to an injury 1 year ago when she fell landing on her left arm, she describes throbbing and aching pain that radiates to her forearm.  She does experience tingling in her fingers.  She is insulin-dependent diabetic.  She currently receives hydrocodone 10 mg twice daily.      Active Problem List:  Patient Active Problem List   Diagnosis   • Left ureteral calculus   • Recurrent UTI   • Renal calculi   • Hypercalcemia   • Left lateral epicondylitis         Past Medical History:  Past Medical History:   Diagnosis Date   • Asthma    • Chronic back pain    • Diabetes mellitus (CMS/HCC)    • Diverticulitis    • Gastritis    • GERD (gastroesophageal reflux disease)    • Gout    • High cholesterol    • Hyperlipemia    • Hypertension    • IBS (irritable bowel syndrome)    • Sleep apnea    • Thyroid disease          Past Surgical History:  Past Surgical History:   Procedure Laterality Date   • APPENDECTOMY     • CHOLECYSTECTOMY     • HYSTERECTOMY           Family History:  Family History   Problem Relation Age of Onset   • Heart disease Father    • Diabetes Father    • Osteoarthritis Mother    • Diabetes Mother    • Diabetes Sister    • Diabetes Brother    • Heart disease Maternal Grandmother    • Osteoarthritis Maternal Grandfather    • Heart disease Maternal Grandfather    • Diabetes Maternal Grandfather          Social History:  Social History     Socioeconomic History   • Marital status:      Spouse name: Not on file   • Number of children: Not on file   • Years of education: Not on file   • Highest education level: Not on file   Tobacco Use   • Smoking status: Former Smoker    • Smokeless tobacco: Never Used   Substance and Sexual Activity   • Alcohol use: No   • Drug use: No   • Sexual activity: Yes     Partners: Male     Birth control/protection: None     Body mass index is 35.74 kg/m².      Medications:  Current Outpatient Medications   Medication Sig Dispense Refill   • amitriptyline (ELAVIL) 25 MG tablet TK 1 T PO  HS  5   • aspirin 81 MG EC tablet Take 81 mg by mouth Daily.     • atenolol (TENORMIN) 50 MG tablet Take 50 mg by mouth Daily.  5   • B-D ULTRAFINE III SHORT PEN 31G X 8 MM misc U UTD  5   • Bydureon BCise 2 MG/0.85ML auto-injector injection INJECT 1 SYRINGE  UNDER SKIN ONCE A WEEK     • DEXILANT 30 MG capsule TK 1 C PO QD  5   • dicyclomine (BENTYL) 20 MG tablet TK 1 T PO  TID PRN  6   • fenofibrate micronized (LOFIBRA) 134 MG capsule TK ONE C PO  QD  4   • furosemide (LASIX) 20 MG tablet Take  by mouth Daily.  5   • gabapentin (NEURONTIN) 300 MG capsule TK ONE C PO  TID  0   • hydrOXYzine (ATARAX) 25 MG tablet TK 1 T PO  TID  2   • insulin aspart (novoLOG) 100 UNIT/ML injection Inject  under the skin into the appropriate area as directed 3 (Three) Times a Day Before Meals.     • Insulin Glargine (BASAGLAR KWIKPEN) 100 UNIT/ML injection pen 50 Units.     • Insulin Lispro (ADMELOG SOLOSTAR) 100 UNIT/ML injection pen INJECT 25 UNITS UNDER SKIN TID     • levothyroxine (SYNTHROID, LEVOTHROID) 50 MCG tablet TK 1 T PO  DAILY  5   • Omega-3 Fatty Acids (FISH OIL) 1000 MG capsule capsule TK 1 C PO QID  6   • ONETOUCH VERIO test strip USE TO TEST SUGAR BEFORE MEALS AND AT BEDTIME  5   • pravastatin (PRAVACHOL) 10 MG tablet TK 1 T PO  QHS  6   • promethazine (PHENERGAN) 25 MG tablet Take 1 tablet by mouth Every 6 (Six) Hours As Needed for Nausea or Vomiting. 21 tablet 2   • hydrochlorothiazide (HYDRODIURIL) 25 MG tablet Take  by mouth Daily.  5   • HYDROcodone-acetaminophen (NORCO)  MG per tablet TK 1 T PO  BID PRN  0   • HYDROcodone-acetaminophen (Norco)  MG per  tablet Take 1 tablet by mouth Every 6 (Six) Hours As Needed for Moderate Pain . 12 tablet 0   • HYDROcodone-acetaminophen (Norco)  MG per tablet Take 12 tablets by mouth Every 6 (Six) Hours As Needed for Moderate Pain . 12 tablet 0   • HYDROcodone-acetaminophen (Norco)  MG per tablet Take 1 tablet by mouth Every 6 (Six) Hours As Needed for Moderate Pain . 16 tablet 0   • insulin detemir (LEVEMIR) 100 UNIT/ML injection Inject 50 Units under the skin into the appropriate area as directed 2 (Two) Times a Day.     • lisinopril (PRINIVIL,ZESTRIL) 5 MG tablet Take  by mouth Daily.  2   • metFORMIN (GLUCOPHAGE) 500 MG tablet Take 500 mg by mouth 2 (Two) Times a Day With Meals.     • ondansetron (ZOFRAN) 4 MG tablet Take 1 tablet by mouth Daily As Needed for Nausea or Vomiting. 30 tablet 1   • oxyCODONE-acetaminophen (PERCOCET)  MG per tablet Take 1 tablet by mouth Every 6 (Six) Hours As Needed for Moderate Pain . 12 tablet 0   • tamsulosin (Flomax) 0.4 MG capsule 24 hr capsule Take 1 capsule by mouth Every Night. 30 capsule 5   • venlafaxine XR (EFFEXOR-XR) 150 MG 24 hr capsule TK 1 C PO D  4     No current facility-administered medications for this visit.         Allergies:  Allergies   Allergen Reactions   • Tylenol With Codeine #3 [Acetaminophen-Codeine] Arrhythmia   • Codeine Hives, Itching and Other (See Comments)     Heart races   • Azithromycin Itching   • Contrast Dye Itching and Rash         Review of Systems:   Review of Systems   HENT: Negative.    Eyes: Positive for pain.   Respiratory: Positive for wheezing.    Cardiovascular: Positive for palpitations and leg swelling.   Gastrointestinal: Positive for abdominal pain and nausea.   Endocrine: Negative.    Genitourinary: Positive for dysuria and frequency.   Musculoskeletal: Positive for arthralgias and joint swelling.   Skin: Negative.    Allergic/Immunologic: Negative.    Neurological: Positive for weakness and numbness.   Hematological:  "Negative.    Psychiatric/Behavioral: Positive for dysphoric mood and sleep disturbance. The patient is nervous/anxious.          Physical Exam:   Physical Exam  GENERAL: 56 y.o. female, alert and oriented X 3 in no acute distress.   Visit Vitals  /81   Pulse 91   Ht 162.6 cm (64\")   Wt 94.4 kg (208 lb 3.2 oz)   BMI 35.74 kg/m²         Musculoskeletal:   Examination left upper extremity reveals good mobility of her shoulder with minimal discomfort.  She demonstrates full flexion extension of her left elbow mild pain with full pronation supination, exquisite tenderness superficial to the lateral epicondyle.  Mildly diminished sensation over the dorsal aspect of her hands and fingers subjectively.        Radiology/Labs:     XR Elbow 3+ View Left    Result Date: 8/18/2021    No acute findings in the left elbow.  This report was finalized on 8/18/2021 10:27 AM by Dr. Bryan Field MD.      Radiographs left elbow by report are negative by my review there is calcification superficial to the lateral epicondyle consistent with injury.      Assessment & Plan:   56 y.o. female presents with 1 year history of left elbow pain fairly severe with findings of tenderness over the lateral epicondyle supported by radiographic findings of calcification provisional to the lateral epicondyle.  She is initially provided lidocaine superficial to the lateral epicondyle after 5 minutes she had significant improvement in her pain was able to demonstrate full elbow mobility and wrist mobility without pain.  This was followed by injection of Depo-Medrol 20 mg superficial to the lateral epicondyle.  We are aware of her insulin dependent diabetes she will keep close monitor of her blood glucose she will follow-up in 4 weeks.        ICD-10-CM ICD-9-CM   1. Left lateral epicondylitis  M77.12 726.32         Left lateral epicondyle    Date/Time: 8/18/2021 12:09 PM  Performed by: Mark Anthony Shanks MD  Authorized by: Mark Anthony Shanks, " "MD   Consent: Verbal consent obtained. Written consent obtained.  Risks and benefits: risks, benefits and alternatives were discussed  Consent given by: patient  Patient understanding: patient states understanding of the procedure being performed  Patient consent: the patient's understanding of the procedure matches consent given  Procedure consent: procedure consent matches procedure scheduled  Test results: test results available and properly labeled  Site marked: the operative site was marked  Imaging studies: imaging studies available  Patient identity confirmed: verbally with patient  Time out: Immediately prior to procedure a \"time out\" was called to verify the correct patient, procedure, equipment, support staff and site/side marked as required.  Preparation: Patient was prepped and draped in the usual sterile fashion.  Local anesthesia used: yes  Anesthesia: local infiltration    Anesthesia:  Local anesthesia used: yes  Anesthetic total: 5 mL    Sedation:  Patient sedated: no    Patient tolerance: Patient tolerated the procedure well with no immediate complications and patient tolerated the procedure well with no immediate complications  Comments: 20mg given left lateral epicondyle  Medications administered: 5 mL lidocaine 2%; 40 mg methylPREDNISolone acetate 40 MG/ML            Cc:   Celeste Silva MD                This document has been electronically signed by Mark Anthony Shanks MD   August 18, 2021 17:12 EDT      "

## 2021-08-19 ENCOUNTER — PATIENT ROUNDING (BHMG ONLY) (OUTPATIENT)
Dept: ORTHOPEDIC SURGERY | Facility: CLINIC | Age: 56
End: 2021-08-19

## 2021-08-19 NOTE — PROGRESS NOTES
"August 19, 2021    Hello, may I speak with Dari Adams?    My name is Avis Pena.       I am  with [facility] [place]  [facility]  [address]  [place] [address] [phone].    Before we get started may I verify your date of birth? 1965    I am calling to officially welcome you to our practice and ask about your recent visit. Is this a good time to talk? yes    Tell me about your visit with us. What things went well?  \"The whole visit went well. Very pleased.\"       We're always looking for ways to make our patients' experiences even better. Do you have recommendations on ways we may improve?  no    Overall were you satisfied with your first visit to our practice? yes       I appreciate you taking the time to speak with me today. Is there anything else I can do for you? no      Thank you, and have a great day.      "

## 2021-08-23 RX ORDER — METHYLPREDNISOLONE ACETATE 40 MG/ML
40 INJECTION, SUSPENSION INTRA-ARTICULAR; INTRALESIONAL; INTRAMUSCULAR; SOFT TISSUE
Status: COMPLETED | OUTPATIENT
Start: 2021-08-18 | End: 2021-08-18

## 2021-08-23 RX ORDER — LIDOCAINE HYDROCHLORIDE 20 MG/ML
5 INJECTION, SOLUTION INFILTRATION; PERINEURAL
Status: COMPLETED | OUTPATIENT
Start: 2021-08-18 | End: 2021-08-18

## 2021-09-13 ENCOUNTER — HOSPITAL ENCOUNTER (OUTPATIENT)
Dept: GENERAL RADIOLOGY | Facility: HOSPITAL | Age: 56
Discharge: HOME | End: 2021-09-13
Admitting: UROLOGY

## 2021-09-13 ENCOUNTER — OFFICE VISIT (OUTPATIENT)
Dept: UROLOGY | Facility: CLINIC | Age: 56
End: 2021-09-13

## 2021-09-13 VITALS — BODY MASS INDEX: 35.51 KG/M2 | WEIGHT: 208 LBS | HEIGHT: 64 IN

## 2021-09-13 DIAGNOSIS — N20.0 RENAL CALCULI: ICD-10-CM

## 2021-09-13 DIAGNOSIS — N20.0 RENAL CALCULI: Primary | ICD-10-CM

## 2021-09-13 DIAGNOSIS — N20.1 LEFT URETERAL CALCULUS: ICD-10-CM

## 2021-09-13 LAB
BILIRUB BLD-MCNC: NEGATIVE MG/DL
CLARITY, POC: CLEAR
COLOR UR: YELLOW
GLUCOSE UR STRIP-MCNC: NEGATIVE MG/DL
KETONES UR QL: NEGATIVE
LEUKOCYTE EST, POC: NEGATIVE
NITRITE UR-MCNC: NEGATIVE MG/ML
PH UR: 5.5 [PH] (ref 5–8)
PROT UR STRIP-MCNC: NEGATIVE MG/DL
RBC # UR STRIP: NEGATIVE /UL
SP GR UR: 1.01 (ref 1–1.03)
UROBILINOGEN UR QL: NORMAL

## 2021-09-13 PROCEDURE — 99214 OFFICE O/P EST MOD 30 MIN: CPT | Performed by: UROLOGY

## 2021-09-13 PROCEDURE — 74018 RADEX ABDOMEN 1 VIEW: CPT | Performed by: RADIOLOGY

## 2021-09-13 PROCEDURE — 74018 RADEX ABDOMEN 1 VIEW: CPT

## 2021-09-13 RX ORDER — GABAPENTIN 600 MG/1
600 TABLET ORAL 3 TIMES DAILY PRN
COMMUNITY
Start: 2021-09-08

## 2021-09-13 RX ORDER — HYDROCODONE BITARTRATE AND ACETAMINOPHEN 10; 325 MG/1; MG/1
1 TABLET ORAL EVERY 6 HOURS PRN
Qty: 16 TABLET | Refills: 0 | Status: SHIPPED | OUTPATIENT
Start: 2021-09-13 | End: 2021-11-01

## 2021-09-13 RX ORDER — PHENAZOPYRIDINE HYDROCHLORIDE 200 MG/1
200 TABLET, FILM COATED ORAL 3 TIMES DAILY PRN
COMMUNITY
Start: 2021-09-08 | End: 2021-12-06 | Stop reason: ALTCHOICE

## 2021-09-13 RX ORDER — TAMSULOSIN HYDROCHLORIDE 0.4 MG/1
1 CAPSULE ORAL NIGHTLY
Qty: 30 CAPSULE | Refills: 5 | Status: SHIPPED | OUTPATIENT
Start: 2021-09-13

## 2021-09-13 RX ORDER — FERROUS SULFATE 325(65) MG
1 TABLET ORAL DAILY
COMMUNITY
Start: 2021-09-10

## 2021-09-13 RX ORDER — LEVOCETIRIZINE DIHYDROCHLORIDE 5 MG/1
5 TABLET, FILM COATED ORAL DAILY
COMMUNITY
Start: 2021-09-10

## 2021-09-13 RX ORDER — FENOFIBRATE 145 MG/1
145 TABLET, COATED ORAL DAILY
COMMUNITY
Start: 2021-08-30

## 2021-09-13 RX ORDER — FAMOTIDINE 20 MG/1
20 TABLET, FILM COATED ORAL
COMMUNITY
Start: 2021-08-30

## 2021-09-13 NOTE — PROGRESS NOTES
Chief Complaint:          Chief Complaint   Patient presents with   • renal calculi       HPI:   56 y.o. female well-known to me returns today I reviewed her CT she has a 1.5 mm left lower pole stone she is passed 3 stones she is recently lost 38 pounds we discussed stone prevention.  Since she passed the stones yesterday is one other smaller stone the past as documented we will give her one short course of pain medication have available I will see her back in 3 months.  She returns today she is going to a new physician apparently Dr. Silva told her she had stones but he would give her pain medication she says she has had 69 stones today her urine is negative her KUB is negative I gave her reassurance. Probably has residual pain from the passage of a stone. For short course of pain medication, nausea medication and Flomax to facilitate the passage of any small fragments or not visible on KUB      Past Medical History:        Past Medical History:   Diagnosis Date   • Asthma    • Chronic back pain    • Diabetes mellitus (CMS/HCC)    • Diverticulitis    • Gastritis    • GERD (gastroesophageal reflux disease)    • Gout    • High cholesterol    • Hyperlipemia    • Hypertension    • IBS (irritable bowel syndrome)    • Sleep apnea    • Thyroid disease          Current Meds:     Current Outpatient Medications   Medication Sig Dispense Refill   • amitriptyline (ELAVIL) 25 MG tablet TK 1 T PO  HS  5   • aspirin 81 MG EC tablet Take 81 mg by mouth Daily.     • atenolol (TENORMIN) 50 MG tablet Take 50 mg by mouth Daily.  5   • B-D ULTRAFINE III SHORT PEN 31G X 8 MM misc U UTD  5   • Bydureon BCise 2 MG/0.85ML auto-injector injection INJECT 1 SYRINGE  UNDER SKIN ONCE A WEEK     • dicyclomine (BENTYL) 20 MG tablet TK 1 T PO  TID PRN  6   • famotidine (PEPCID) 20 MG tablet Take 20 mg by mouth every night at bedtime.     • fenofibrate (TRICOR) 145 MG tablet Take 145 mg by mouth Daily.     • FeroSul 325 (65 Fe) MG tablet Take 1  tablet by mouth Daily.     • furosemide (LASIX) 20 MG tablet Take  by mouth Daily.  5   • gabapentin (NEURONTIN) 300 MG capsule TK ONE C PO  TID  0   • gabapentin (NEURONTIN) 600 MG tablet Take 600 mg by mouth 3 (Three) Times a Day As Needed.     • hydrochlorothiazide (HYDRODIURIL) 25 MG tablet Take  by mouth Daily.  5   • HYDROcodone-acetaminophen (NORCO)  MG per tablet TK 1 T PO  BID PRN  0   • HYDROcodone-acetaminophen (Norco)  MG per tablet Take 1 tablet by mouth Every 6 (Six) Hours As Needed for Moderate Pain . 12 tablet 0   • HYDROcodone-acetaminophen (Norco)  MG per tablet Take 12 tablets by mouth Every 6 (Six) Hours As Needed for Moderate Pain . 12 tablet 0   • HYDROcodone-acetaminophen (Norco)  MG per tablet Take 1 tablet by mouth Every 6 (Six) Hours As Needed for Moderate Pain . 16 tablet 0   • hydrOXYzine (ATARAX) 25 MG tablet TK 1 T PO  TID  2   • insulin aspart (novoLOG) 100 UNIT/ML injection Inject  under the skin into the appropriate area as directed 3 (Three) Times a Day Before Meals.     • insulin detemir (LEVEMIR) 100 UNIT/ML injection Inject 50 Units under the skin into the appropriate area as directed 2 (Two) Times a Day.     • Insulin Glargine (BASAGLAR KWIKPEN) 100 UNIT/ML injection pen 50 Units.     • Insulin Lispro (ADMELOG SOLOSTAR) 100 UNIT/ML injection pen INJECT 25 UNITS UNDER SKIN TID     • levocetirizine (XYZAL) 5 MG tablet Take 5 mg by mouth Daily.     • levothyroxine (SYNTHROID, LEVOTHROID) 50 MCG tablet TK 1 T PO  DAILY  5   • lisinopril (PRINIVIL,ZESTRIL) 5 MG tablet Take  by mouth Daily.  2   • metFORMIN (GLUCOPHAGE) 500 MG tablet Take 500 mg by mouth 2 (Two) Times a Day With Meals.     • Omega-3 Fatty Acids (FISH OIL) 1000 MG capsule capsule TK 1 C PO QID  6   • ondansetron (ZOFRAN) 4 MG tablet Take 1 tablet by mouth Daily As Needed for Nausea or Vomiting. 30 tablet 1   • ONETOUCH VERIO test strip USE TO TEST SUGAR BEFORE MEALS AND AT BEDTIME  5   •  oxyCODONE-acetaminophen (PERCOCET)  MG per tablet Take 1 tablet by mouth Every 6 (Six) Hours As Needed for Moderate Pain . 12 tablet 0   • phenazopyridine (PYRIDIUM) 200 MG tablet Take 200 mg by mouth 3 (Three) Times a Day As Needed.     • pravastatin (PRAVACHOL) 10 MG tablet TK 1 T PO  QHS  6   • ProAir  (90 Base) MCG/ACT inhaler INHALE 1 PUFF BY MOUTH EVERY 4 HOURS AS NEEDED     • promethazine (PHENERGAN) 25 MG tablet Take 1 tablet by mouth Every 6 (Six) Hours As Needed for Nausea or Vomiting. 21 tablet 2   • tamsulosin (Flomax) 0.4 MG capsule 24 hr capsule Take 1 capsule by mouth Every Night. 30 capsule 5   • venlafaxine XR (EFFEXOR-XR) 150 MG 24 hr capsule TK 1 C PO D  4     No current facility-administered medications for this visit.        Allergies:      Allergies   Allergen Reactions   • Tylenol With Codeine #3 [Acetaminophen-Codeine] Arrhythmia   • Codeine Hives, Itching and Other (See Comments)     Heart races   • Azithromycin Itching   • Contrast Dye Itching and Rash        Past Surgical History:     Past Surgical History:   Procedure Laterality Date   • APPENDECTOMY     • CHOLECYSTECTOMY     • HYSTERECTOMY           Social History:     Social History     Socioeconomic History   • Marital status:      Spouse name: Not on file   • Number of children: Not on file   • Years of education: Not on file   • Highest education level: Not on file   Tobacco Use   • Smoking status: Former Smoker   • Smokeless tobacco: Never Used   Substance and Sexual Activity   • Alcohol use: No   • Drug use: No   • Sexual activity: Yes     Partners: Male     Birth control/protection: None       Family History:     Family History   Problem Relation Age of Onset   • Heart disease Father    • Diabetes Father    • Osteoarthritis Mother    • Diabetes Mother    • Diabetes Sister    • Diabetes Brother    • Heart disease Maternal Grandmother    • Osteoarthritis Maternal Grandfather    • Heart disease Maternal  Grandfather    • Diabetes Maternal Grandfather        Review of Systems:     Review of Systems   Constitutional: Negative.  Negative for activity change, appetite change, chills, diaphoresis, fatigue and unexpected weight change.   HENT: Negative for congestion, dental problem, drooling, ear discharge, ear pain, facial swelling, hearing loss, mouth sores, nosebleeds, postnasal drip, rhinorrhea, sinus pressure, sneezing, sore throat, tinnitus, trouble swallowing and voice change.    Eyes: Negative.  Negative for photophobia, pain, discharge, redness, itching and visual disturbance.   Respiratory: Negative.  Negative for apnea, cough, choking, chest tightness, shortness of breath, wheezing and stridor.    Cardiovascular: Negative.  Negative for chest pain, palpitations and leg swelling.   Gastrointestinal: Positive for nausea and vomiting. Negative for abdominal distention, abdominal pain, anal bleeding, blood in stool, constipation, diarrhea and rectal pain.   Endocrine: Negative.  Negative for cold intolerance, heat intolerance, polydipsia, polyphagia and polyuria.   Genitourinary: Positive for flank pain.   Musculoskeletal: Negative for arthralgias, back pain, gait problem, joint swelling, myalgias, neck pain and neck stiffness.   Skin: Negative.  Negative for color change, pallor, rash and wound.   Allergic/Immunologic: Negative.  Negative for environmental allergies, food allergies and immunocompromised state.   Neurological: Negative.  Negative for dizziness, tremors, seizures, syncope, facial asymmetry, speech difficulty, weakness, light-headedness, numbness and headaches.   Hematological: Negative.  Negative for adenopathy. Does not bruise/bleed easily.   Psychiatric/Behavioral: Negative for agitation, behavioral problems, confusion, decreased concentration, dysphoric mood, hallucinations, self-injury, sleep disturbance and suicidal ideas. The patient is not nervous/anxious and is not hyperactive.    All other  systems reviewed and are negative.      Physical Exam:     Physical Exam  Constitutional:       Appearance: She is well-developed.   HENT:      Head: Normocephalic and atraumatic.      Right Ear: External ear normal.      Left Ear: External ear normal.   Eyes:      Conjunctiva/sclera: Conjunctivae normal.      Pupils: Pupils are equal, round, and reactive to light.   Cardiovascular:      Rate and Rhythm: Normal rate and regular rhythm.      Heart sounds: Normal heart sounds.   Pulmonary:      Effort: Pulmonary effort is normal.      Breath sounds: Normal breath sounds.   Abdominal:      General: Bowel sounds are normal. There is no distension.      Palpations: Abdomen is soft. There is no mass.      Tenderness: There is no abdominal tenderness. There is no guarding or rebound.   Genitourinary:     Vagina: No vaginal discharge.   Musculoskeletal:         General: Normal range of motion.   Skin:     General: Skin is warm and dry.   Neurological:      Mental Status: She is alert.      Deep Tendon Reflexes: Reflexes are normal and symmetric.   Psychiatric:         Behavior: Behavior normal.         Thought Content: Thought content normal.         Judgment: Judgment normal.         I have reviewed the following portions of the patient's history: allergies, current medications, past family history, past medical history, past social history, past surgical history, problem list and ROS and confirm it's accurate.      Procedure:       Assessment/Plan:   Renal calculus-we discussed the presence of the stone we discussed the various therapeutic options available including percutaneous nephrostolithotomy, lithotripsy.  We discussed the risks of lithotripsy including the passage of stones the development of a large string of stones in the distal ureter known as Steinstrasse.  In the 3% incidence of that we will need to proceed with a ureteroscopy for obstructing fragments.  Extremely rare incidence of renal hematoma.  And the  significance of this.  We discussed the absolute relative indicators for intervention including the presence of sepsis, and pain we cannot control is the primary need for urgent intervention.  We discussed placement of a stent if indicated and the management of the stent as well. She clearly passed a small stone and has known history of very small stones in the kidney sometimes not visible on plain film  Narcotic pain medication-patient has significant acute pain that I believe would be an indication for the use of narcotic pain medication.  I discussed the significant risks of pain medication and the fact that this will be a short only option and I will give her no more than a three-day supply of pain medication.  And I will not plan long-term medication and that this will be sent to a pain clinic if at all becomes necessary.  We discussed signing a pain medication agreement and the fact that we're going to run a state [facility] review to be sure the patient is not getting pain medication from elsewhere.  If this is the case we will not give pain medication.  As part of the patient's treatment plan of there being prescribed a controlled substance with potential for abuse.  This patient has been wait aware of the appropriate dose of such medications including, the risk for somnolence, limited ability to drive and/or safety and the significant potential for overdose.  It is been made clear that these medications are for the prescribed patient only without concomitant use of alcohol or other sepsis unless prescribed by the medical provider.  Has completed prescribing agreement detailing the terms of continue prescribing him a controlled substance.  Including monitoring Christie ports, the possibility of urine drug screens and pedal counts.  The patient is aware that we reviewed CHRISTIE reports on a regular basis and scan them into the chart.  History and physical examination exhibited continued safe and appropriate use of  controlled substances.  Also discussed the fact that the new [place] legislation allows only a three-day prescription for pain medication.  In this situation he will be referred to a chronic pain clinic.  Nausea and vomiting-patient has significant nausea vomiting as a consequence of this we recommended Phenergan we also discussed the use of Zofran and the sedating side effects of Phenergan as well.                      This document has been electronically signed by ROSALIND WILLIAM MD September 13, 2021 13:02 EDT

## 2021-09-20 ENCOUNTER — OFFICE VISIT (OUTPATIENT)
Dept: ORTHOPEDIC SURGERY | Facility: CLINIC | Age: 56
End: 2021-09-20

## 2021-09-20 VITALS — WEIGHT: 211.8 LBS | HEIGHT: 64 IN | BODY MASS INDEX: 36.16 KG/M2

## 2021-09-20 DIAGNOSIS — M65.311 TRIGGER THUMB, RIGHT THUMB: Primary | ICD-10-CM

## 2021-09-20 PROCEDURE — 99213 OFFICE O/P EST LOW 20 MIN: CPT | Performed by: ORTHOPAEDIC SURGERY

## 2021-09-20 PROCEDURE — 20550 NJX 1 TENDON SHEATH/LIGAMENT: CPT | Performed by: ORTHOPAEDIC SURGERY

## 2021-09-20 RX ADMIN — LIDOCAINE HYDROCHLORIDE 5 ML: 20 INJECTION, SOLUTION INFILTRATION; PERINEURAL at 14:04

## 2021-09-20 RX ADMIN — METHYLPREDNISOLONE ACETATE 40 MG: 40 INJECTION, SUSPENSION INTRA-ARTICULAR; INTRALESIONAL; INTRAMUSCULAR; SOFT TISSUE at 14:04

## 2021-09-20 NOTE — PROGRESS NOTES
Established New Problem         Patient: Dari Adams  YOB: 1965  Date of Encounter: 09/20/2021      Chief  Complaint:   Chief Complaint   Patient presents with   • Left Elbow - Pain, Follow-up   • Right Hand - Pain, Follow-up         HPI:  Dari Adams, 56 y.o. female presents complaints of locking sensation to her right thumb.  She reports no trauma.  He denies weakness or numbness to her right hand.      Medical History:  Patient Active Problem List   Diagnosis   • Left ureteral calculus   • Recurrent UTI   • Renal calculi   • Hypercalcemia   • Left lateral epicondylitis   • Trigger thumb, right thumb     Past Medical History:   Diagnosis Date   • Asthma    • Chronic back pain    • Diabetes mellitus (CMS/HCC)    • Diverticulitis    • Gastritis    • GERD (gastroesophageal reflux disease)    • Gout    • High cholesterol    • Hyperlipemia    • Hypertension    • IBS (irritable bowel syndrome)    • Sleep apnea    • Thyroid disease          Social History:  Social History     Socioeconomic History   • Marital status:      Spouse name: Not on file   • Number of children: Not on file   • Years of education: Not on file   • Highest education level: Not on file   Tobacco Use   • Smoking status: Former Smoker   • Smokeless tobacco: Never Used   Substance and Sexual Activity   • Alcohol use: No   • Drug use: No   • Sexual activity: Yes     Partners: Male     Birth control/protection: None         Current Medications:    Current Outpatient Medications:   •  amitriptyline (ELAVIL) 25 MG tablet, TK 1 T PO  HS, Disp: , Rfl: 5  •  aspirin 81 MG EC tablet, Take 81 mg by mouth Daily., Disp: , Rfl:   •  atenolol (TENORMIN) 50 MG tablet, Take 50 mg by mouth Daily., Disp: , Rfl: 5  •  B-D ULTRAFINE III SHORT PEN 31G X 8 MM misc, U UTD, Disp: , Rfl: 5  •  Bydureon BCise 2 MG/0.85ML auto-injector injection, INJECT 1 SYRINGE  UNDER SKIN ONCE A WEEK, Disp: , Rfl:   •  dicyclomine (BENTYL) 20 MG tablet, TK 1 T PO   TID PRN, Disp: , Rfl: 6  •  famotidine (PEPCID) 20 MG tablet, Take 20 mg by mouth every night at bedtime., Disp: , Rfl:   •  fenofibrate (TRICOR) 145 MG tablet, Take 145 mg by mouth Daily., Disp: , Rfl:   •  FeroSul 325 (65 Fe) MG tablet, Take 1 tablet by mouth Daily., Disp: , Rfl:   •  furosemide (LASIX) 20 MG tablet, Take  by mouth Daily., Disp: , Rfl: 5  •  gabapentin (NEURONTIN) 300 MG capsule, TK ONE C PO  TID, Disp: , Rfl: 0  •  gabapentin (NEURONTIN) 600 MG tablet, Take 600 mg by mouth 3 (Three) Times a Day As Needed., Disp: , Rfl:   •  hydrochlorothiazide (HYDRODIURIL) 25 MG tablet, Take  by mouth Daily., Disp: , Rfl: 5  •  HYDROcodone-acetaminophen (NORCO)  MG per tablet, TK 1 T PO  BID PRN, Disp: , Rfl: 0  •  HYDROcodone-acetaminophen (Norco)  MG per tablet, Take 1 tablet by mouth Every 6 (Six) Hours As Needed for Moderate Pain ., Disp: 12 tablet, Rfl: 0  •  HYDROcodone-acetaminophen (Norco)  MG per tablet, Take 12 tablets by mouth Every 6 (Six) Hours As Needed for Moderate Pain ., Disp: 12 tablet, Rfl: 0  •  HYDROcodone-acetaminophen (Norco)  MG per tablet, Take 1 tablet by mouth Every 6 (Six) Hours As Needed for Moderate Pain ., Disp: 16 tablet, Rfl: 0  •  HYDROcodone-acetaminophen (Norco)  MG per tablet, Take 1 tablet by mouth Every 6 (Six) Hours As Needed for Moderate Pain ., Disp: 16 tablet, Rfl: 0  •  hydrOXYzine (ATARAX) 25 MG tablet, TK 1 T PO  TID, Disp: , Rfl: 2  •  insulin aspart (novoLOG) 100 UNIT/ML injection, Inject  under the skin into the appropriate area as directed 3 (Three) Times a Day Before Meals., Disp: , Rfl:   •  insulin detemir (LEVEMIR) 100 UNIT/ML injection, Inject 50 Units under the skin into the appropriate area as directed 2 (Two) Times a Day., Disp: , Rfl:   •  Insulin Glargine (BASAGLAR KWIKPEN) 100 UNIT/ML injection pen, 50 Units., Disp: , Rfl:   •  Insulin Lispro (ADMELOG SOLOSTAR) 100 UNIT/ML injection pen, INJECT 25 UNITS UNDER SKIN TID,  Disp: , Rfl:   •  levocetirizine (XYZAL) 5 MG tablet, Take 5 mg by mouth Daily., Disp: , Rfl:   •  levothyroxine (SYNTHROID, LEVOTHROID) 50 MCG tablet, TK 1 T PO  DAILY, Disp: , Rfl: 5  •  lisinopril (PRINIVIL,ZESTRIL) 5 MG tablet, Take  by mouth Daily., Disp: , Rfl: 2  •  metFORMIN (GLUCOPHAGE) 500 MG tablet, Take 500 mg by mouth 2 (Two) Times a Day With Meals., Disp: , Rfl:   •  Omega-3 Fatty Acids (FISH OIL) 1000 MG capsule capsule, TK 1 C PO QID, Disp: , Rfl: 6  •  ondansetron (ZOFRAN) 4 MG tablet, Take 1 tablet by mouth Daily As Needed for Nausea or Vomiting., Disp: 30 tablet, Rfl: 1  •  ONETOUCH VERIO test strip, USE TO TEST SUGAR BEFORE MEALS AND AT BEDTIME, Disp: , Rfl: 5  •  oxyCODONE-acetaminophen (PERCOCET)  MG per tablet, Take 1 tablet by mouth Every 6 (Six) Hours As Needed for Moderate Pain ., Disp: 12 tablet, Rfl: 0  •  phenazopyridine (PYRIDIUM) 200 MG tablet, Take 200 mg by mouth 3 (Three) Times a Day As Needed., Disp: , Rfl:   •  pravastatin (PRAVACHOL) 10 MG tablet, TK 1 T PO  QHS, Disp: , Rfl: 6  •  ProAir  (90 Base) MCG/ACT inhaler, INHALE 1 PUFF BY MOUTH EVERY 4 HOURS AS NEEDED, Disp: , Rfl:   •  promethazine (PHENERGAN) 25 MG tablet, Take 1 tablet by mouth Every 6 (Six) Hours As Needed for Nausea or Vomiting., Disp: 21 tablet, Rfl: 2  •  tamsulosin (Flomax) 0.4 MG capsule 24 hr capsule, Take 1 capsule by mouth Every Night., Disp: 30 capsule, Rfl: 5  •  tamsulosin (Flomax) 0.4 MG capsule 24 hr capsule, Take 1 capsule by mouth Every Night., Disp: 30 capsule, Rfl: 5  •  venlafaxine XR (EFFEXOR-XR) 150 MG 24 hr capsule, TK 1 C PO D, Disp: , Rfl: 4      Allergies:  Allergies   Allergen Reactions   • Tylenol With Codeine #3 [Acetaminophen-Codeine] Arrhythmia   • Codeine Hives, Itching and Other (See Comments)     Heart races   • Azithromycin Itching   • Contrast Dye Itching and Rash         Family History:  Family History   Problem Relation Age of Onset   • Heart disease Father    •  "Diabetes Father    • Osteoarthritis Mother    • Diabetes Mother    • Diabetes Sister    • Diabetes Brother    • Heart disease Maternal Grandmother    • Osteoarthritis Maternal Grandfather    • Heart disease Maternal Grandfather    • Diabetes Maternal Grandfather          Surgical History:  Past Surgical History:   Procedure Laterality Date   • APPENDECTOMY     • CHOLECYSTECTOMY     • HYSTERECTOMY           Examination:   Examination right hand demonstrates palpable nodule base of her thumb with locking sensation with flexion extension findings consistent with her thumb.  She has normal sensation negative grind test negative Finkelstein's test.        Assessment & Plan:   56 y.o. female presents clinical findings consistent with right trigger thumb we discussed her options she is not interested in surgical solution. Today she was treated with steroid injection Depo-Medrol 40 mg lidocaine block flexor tendon sheath right thumb. She is provided a thumb spica brace she will follow-up as needed.         Diagnosis Plan   1. Trigger thumb, right thumb  Trigger thumb right         Trigger thumb right    Date/Time: 9/20/2021 2:04 PM  Performed by: Mark Anthony Shanks MD  Authorized by: Mark Anthony Shanks MD   Consent: Verbal consent obtained. Written consent obtained.  Risks and benefits: risks, benefits and alternatives were discussed  Consent given by: patient  Patient understanding: patient states understanding of the procedure being performed  Patient consent: the patient's understanding of the procedure matches consent given  Procedure consent: procedure consent matches procedure scheduled  Test results: test results available and properly labeled  Site marked: the operative site was marked  Imaging studies: imaging studies available  Patient identity confirmed: verbally with patient  Time out: Immediately prior to procedure a \"time out\" was called to verify the correct patient, procedure, equipment, support " staff and site/side marked as required.  Preparation: Patient was prepped and draped in the usual sterile fashion.  Local anesthesia used: yes  Anesthesia: local infiltration    Anesthesia:  Local anesthesia used: yes  Anesthetic total: 5 mL    Sedation:  Patient sedated: no    Patient tolerance: Patient tolerated the procedure well with no immediate complications  Medications administered: 5 mL lidocaine 2%; 40 mg methylPREDNISolone acetate 40 MG/ML              Cc:  Nina Vallejo APRN

## 2021-09-21 RX ORDER — LIDOCAINE HYDROCHLORIDE 20 MG/ML
5 INJECTION, SOLUTION INFILTRATION; PERINEURAL
Status: COMPLETED | OUTPATIENT
Start: 2021-09-20 | End: 2021-09-20

## 2021-09-21 RX ORDER — METHYLPREDNISOLONE ACETATE 40 MG/ML
40 INJECTION, SUSPENSION INTRA-ARTICULAR; INTRALESIONAL; INTRAMUSCULAR; SOFT TISSUE
Status: COMPLETED | OUTPATIENT
Start: 2021-09-20 | End: 2021-09-20

## 2021-10-19 ENCOUNTER — TELEPHONE (OUTPATIENT)
Dept: ORTHOPEDIC SURGERY | Facility: CLINIC | Age: 56
End: 2021-10-19

## 2021-10-19 NOTE — TELEPHONE ENCOUNTER
Provider: DR BRISENO  Caller: HERMILA WHITTAKER  Relationship to Patient: SELF  Phone Number: [phone]  Reason for Call: PATIENT CALLED AND WOULD LIKE TO SCHEDULE FOR AN INJECTION PLEASE ADVISE

## 2021-11-01 ENCOUNTER — OFFICE VISIT (OUTPATIENT)
Dept: UROLOGY | Facility: CLINIC | Age: 56
End: 2021-11-01

## 2021-11-01 VITALS — WEIGHT: 211 LBS | BODY MASS INDEX: 36.02 KG/M2 | HEIGHT: 64 IN

## 2021-11-01 DIAGNOSIS — N20.0 RENAL CALCULI: ICD-10-CM

## 2021-11-01 DIAGNOSIS — N39.0 RECURRENT UTI: Primary | ICD-10-CM

## 2021-11-01 LAB
BILIRUB BLD-MCNC: NEGATIVE MG/DL
CLARITY, POC: CLEAR
COLOR UR: YELLOW
EXPIRATION DATE: ABNORMAL
GLUCOSE UR STRIP-MCNC: NEGATIVE MG/DL
KETONES UR QL: NEGATIVE
LEUKOCYTE EST, POC: ABNORMAL
Lab: ABNORMAL
NITRITE UR-MCNC: NEGATIVE MG/ML
PH UR: 5 [PH] (ref 5–8)
PROT UR STRIP-MCNC: NEGATIVE MG/DL
RBC # UR STRIP: NEGATIVE /UL
SP GR UR: 1.03 (ref 1–1.03)
UROBILINOGEN UR QL: NORMAL

## 2021-11-01 PROCEDURE — 99214 OFFICE O/P EST MOD 30 MIN: CPT | Performed by: UROLOGY

## 2021-11-01 PROCEDURE — 87086 URINE CULTURE/COLONY COUNT: CPT | Performed by: UROLOGY

## 2021-11-01 RX ORDER — NITROFURANTOIN 25; 75 MG/1; MG/1
100 CAPSULE ORAL 2 TIMES DAILY
Qty: 20 CAPSULE | Refills: 3 | Status: SHIPPED | OUTPATIENT
Start: 2021-11-01 | End: 2021-12-06 | Stop reason: ALTCHOICE

## 2021-11-01 RX ORDER — HYDROCODONE BITARTRATE AND ACETAMINOPHEN 10; 325 MG/1; MG/1
1 TABLET ORAL EVERY 6 HOURS PRN
Qty: 16 TABLET | Refills: 0 | Status: SHIPPED | OUTPATIENT
Start: 2021-11-01 | End: 2021-12-06 | Stop reason: SDUPTHER

## 2021-11-01 NOTE — PROGRESS NOTES
Chief Complaint:          Chief Complaint   Patient presents with   • Urinary Tract Infection     follow up    • Nephrolithiasis       HPI:   56 y.o. female returns today she states she is passed 84 stones she thinks she passed one in the lobby in the commode her urine was +1+ for leukocytes.  I Corine culture it.  Because she is passing a stone and came so late they were unable to get a KUB she will do tomorrow and I will personally review it.  Impression is that of distal ureteral stone and possible urinary tract infection      Past Medical History:        Past Medical History:   Diagnosis Date   • Asthma    • Chronic back pain    • Diabetes mellitus (HCC)    • Diverticulitis    • Gastritis    • GERD (gastroesophageal reflux disease)    • Gout    • High cholesterol    • Hyperlipemia    • Hypertension    • IBS (irritable bowel syndrome)    • Sleep apnea    • Thyroid disease          Current Meds:     Current Outpatient Medications   Medication Sig Dispense Refill   • amitriptyline (ELAVIL) 25 MG tablet TK 1 T PO  HS  5   • aspirin 81 MG EC tablet Take 81 mg by mouth Daily.     • atenolol (TENORMIN) 50 MG tablet Take 50 mg by mouth Daily.  5   • B-D ULTRAFINE III SHORT PEN 31G X 8 MM misc U UTD  5   • Bydureon BCise 2 MG/0.85ML auto-injector injection INJECT 1 SYRINGE  UNDER SKIN ONCE A WEEK     • dicyclomine (BENTYL) 20 MG tablet TK 1 T PO  TID PRN  6   • famotidine (PEPCID) 20 MG tablet Take 20 mg by mouth every night at bedtime.     • fenofibrate (TRICOR) 145 MG tablet Take 145 mg by mouth Daily.     • FeroSul 325 (65 Fe) MG tablet Take 1 tablet by mouth Daily.     • furosemide (LASIX) 20 MG tablet Take  by mouth Daily.  5   • gabapentin (NEURONTIN) 300 MG capsule TK ONE C PO  TID  0   • gabapentin (NEURONTIN) 600 MG tablet Take 600 mg by mouth 3 (Three) Times a Day As Needed.     • hydrochlorothiazide (HYDRODIURIL) 25 MG tablet Take  by mouth Daily.  5   • HYDROcodone-acetaminophen (NORCO)  MG per tablet TK  1 T PO  BID PRN  0   • hydrOXYzine (ATARAX) 25 MG tablet TK 1 T PO  TID  2   • insulin aspart (novoLOG) 100 UNIT/ML injection Inject  under the skin into the appropriate area as directed 3 (Three) Times a Day Before Meals.     • insulin detemir (LEVEMIR) 100 UNIT/ML injection Inject 50 Units under the skin into the appropriate area as directed 2 (Two) Times a Day.     • Insulin Glargine (BASAGLAR KWIKPEN) 100 UNIT/ML injection pen 50 Units.     • Insulin Lispro (ADMELOG SOLOSTAR) 100 UNIT/ML injection pen INJECT 25 UNITS UNDER SKIN TID     • levocetirizine (XYZAL) 5 MG tablet Take 5 mg by mouth Daily.     • levothyroxine (SYNTHROID, LEVOTHROID) 50 MCG tablet TK 1 T PO  DAILY  5   • lisinopril (PRINIVIL,ZESTRIL) 5 MG tablet Take  by mouth Daily.  2   • metFORMIN (GLUCOPHAGE) 500 MG tablet Take 500 mg by mouth 2 (Two) Times a Day With Meals.     • Omega-3 Fatty Acids (FISH OIL) 1000 MG capsule capsule TK 1 C PO QID  6   • ondansetron (ZOFRAN) 4 MG tablet Take 1 tablet by mouth Daily As Needed for Nausea or Vomiting. 30 tablet 1   • ONETOUCH VERIO test strip USE TO TEST SUGAR BEFORE MEALS AND AT BEDTIME  5   • oxyCODONE-acetaminophen (PERCOCET)  MG per tablet Take 1 tablet by mouth Every 6 (Six) Hours As Needed for Moderate Pain . 12 tablet 0   • phenazopyridine (PYRIDIUM) 200 MG tablet Take 200 mg by mouth 3 (Three) Times a Day As Needed.     • pravastatin (PRAVACHOL) 10 MG tablet TK 1 T PO  QHS  6   • ProAir  (90 Base) MCG/ACT inhaler INHALE 1 PUFF BY MOUTH EVERY 4 HOURS AS NEEDED     • promethazine (PHENERGAN) 25 MG tablet Take 1 tablet by mouth Every 6 (Six) Hours As Needed for Nausea or Vomiting. 21 tablet 2   • tamsulosin (Flomax) 0.4 MG capsule 24 hr capsule Take 1 capsule by mouth Every Night. 30 capsule 5   • tamsulosin (Flomax) 0.4 MG capsule 24 hr capsule Take 1 capsule by mouth Every Night. 30 capsule 5   • venlafaxine XR (EFFEXOR-XR) 150 MG 24 hr capsule TK 1 C PO D  4     No current  facility-administered medications for this visit.        Allergies:      Allergies   Allergen Reactions   • Tylenol With Codeine #3 [Acetaminophen-Codeine] Arrhythmia   • Codeine Hives, Itching and Other (See Comments)     Heart races   • Azithromycin Itching   • Contrast Dye Itching and Rash        Past Surgical History:     Past Surgical History:   Procedure Laterality Date   • APPENDECTOMY     • CHOLECYSTECTOMY     • HYSTERECTOMY           Social History:     Social History     Socioeconomic History   • Marital status:    Tobacco Use   • Smoking status: Former Smoker   • Smokeless tobacco: Never Used   Substance and Sexual Activity   • Alcohol use: No   • Drug use: No   • Sexual activity: Yes     Partners: Male     Birth control/protection: None       Family History:     Family History   Problem Relation Age of Onset   • Heart disease Father    • Diabetes Father    • Osteoarthritis Mother    • Diabetes Mother    • Diabetes Sister    • Diabetes Brother    • Heart disease Maternal Grandmother    • Osteoarthritis Maternal Grandfather    • Heart disease Maternal Grandfather    • Diabetes Maternal Grandfather        Review of Systems:     Review of Systems   Constitutional: Negative.  Negative for activity change, appetite change, chills, diaphoresis, fatigue and unexpected weight change.   HENT: Negative for congestion, dental problem, drooling, ear discharge, ear pain, facial swelling, hearing loss, mouth sores, nosebleeds, postnasal drip, rhinorrhea, sinus pressure, sneezing, sore throat, tinnitus, trouble swallowing and voice change.    Eyes: Negative.  Negative for photophobia, pain, discharge, redness, itching and visual disturbance.   Respiratory: Negative.  Negative for apnea, cough, choking, chest tightness, shortness of breath, wheezing and stridor.    Cardiovascular: Negative.  Negative for chest pain, palpitations and leg swelling.   Gastrointestinal: Negative.  Negative for abdominal distention,  abdominal pain, anal bleeding, blood in stool, constipation, diarrhea, nausea, rectal pain and vomiting.   Endocrine: Negative.  Negative for cold intolerance, heat intolerance, polydipsia, polyphagia and polyuria.   Genitourinary: Positive for difficulty urinating, dysuria, flank pain, frequency, hematuria, pelvic pain and urgency.   Musculoskeletal: Negative for arthralgias, back pain, gait problem, joint swelling, myalgias, neck pain and neck stiffness.   Skin: Negative.  Negative for color change, pallor, rash and wound.   Allergic/Immunologic: Negative.  Negative for environmental allergies, food allergies and immunocompromised state.   Neurological: Negative.  Negative for dizziness, tremors, seizures, syncope, facial asymmetry, speech difficulty, weakness, light-headedness, numbness and headaches.   Hematological: Negative.  Negative for adenopathy. Does not bruise/bleed easily.   Psychiatric/Behavioral: Negative for agitation, behavioral problems, confusion, decreased concentration, dysphoric mood, hallucinations, self-injury, sleep disturbance and suicidal ideas. The patient is not nervous/anxious and is not hyperactive.    All other systems reviewed and are negative.      Physical Exam:     Physical Exam  Constitutional:       Appearance: She is well-developed.   HENT:      Head: Normocephalic and atraumatic.      Right Ear: External ear normal.      Left Ear: External ear normal.   Eyes:      Conjunctiva/sclera: Conjunctivae normal.      Pupils: Pupils are equal, round, and reactive to light.   Cardiovascular:      Rate and Rhythm: Normal rate and regular rhythm.      Heart sounds: Normal heart sounds.   Pulmonary:      Effort: Pulmonary effort is normal.      Breath sounds: Normal breath sounds.   Abdominal:      General: Bowel sounds are normal. There is no distension.      Palpations: Abdomen is soft. There is no mass.      Tenderness: There is no abdominal tenderness. There is no guarding or rebound.    Genitourinary:     Vagina: No vaginal discharge.   Musculoskeletal:         General: Normal range of motion.   Skin:     General: Skin is warm and dry.   Neurological:      Mental Status: She is alert.      Deep Tendon Reflexes: Reflexes are normal and symmetric.   Psychiatric:         Behavior: Behavior normal.         Thought Content: Thought content normal.         Judgment: Judgment normal.         I have reviewed the following portions of the patient's history: Allergies, current medications, past family history, past medical history, past social history, past surgical history, problem list, and ROS and confirm it is accurate.      Procedure:       Assessment/Plan:   Ureteral calculus-patient has been diagnosed with a ureteral calculus.  We have discussed the various parameters regarding spontaneous passage including the notion that a 2 mm stone has a high likelihood of spontaneous passage versus a larger stone being caught up in the upper areas of the urinary tract.  We also discussed the medical management of stone disease and the use of medical expulsive therapy in the form of Flomax.  This is used in an off label setting.  We discussed the indicators for intervention including  absolute indicators such as sepsis and uncontrollable severe pain, as well as the relative indicators of moderate pain that is well-controlled with various analgesia.  I also talked about nonoperative management including ambulation and increasing fluids and hot tub as being an effective adjuncts in the treatment of a ureteral stone.  She is currently passing a stone KUB is pending  Recurrent urinary tract infections-patient has been referred and diagnosed with recurrent urinary tract infections.  We discussed the types of organisms that are found in the urinary tract indicating that the vast majority are results of the patient's own gastrointestinal milind.  We discussed how many of the antibiotics that are utilized can actually  exacerbate these infections by creating resistant organisms and there is only very few antibiotics that are concentrated in the urine and do not affect the rectal reservoir nor cause recurrent yeast vaginitis.  We discussed the risk factors for recurrent infections being intercourse in younger patients and atrophic changes in older patients.  We discussed the symptoms that are found including pain, pressure, burning, frequency, urgency, suprapubic pain, and painful intercourse.  I discussed upper tract symptoms including fevers and chills and indicated the workup would be much more aggressive if the patient were to present with recurrent infections in the face of upper tract symptomatology such as fever.  I discussed the history of vesicoureteral reflux in young patients and finally chronic renal scarring as a result of such.  I recommend concomitant probiotics with treatment with antibiotics to protect the rectal reservoir including over-the-counter yogurt preparations to clement oral pills containing the appropriate probiotics.  Started her on Macrobid pending the results of the culture  Narcotic pain medication-patient has significant acute pain that I believe would be an indication for the use of narcotic pain medication.  I discussed the significant risks of pain medication and the fact that this will be a short only option and I will give her no more than a three-day supply of pain medication, I will not plan long-term medication, and that this will be sent to a pain clinic if at all becomes necessary.  We discussed signing a pain medication agreement and the fact that we're going to run a state CHRISTIE review to be sure the patient is not getting pain medication from elsewhere.  If this is the case, we will not give pain medication as part of the patient's treatment plan of there being prescribed a controlled substance with potential for abuse.  This patient has been well aware of the appropriate dose of such  medications including the risks for somnolence, limited ability to drive and/or safety and the significant potential for overdose.  It has been made clear that these medications are for the prescribed patient only without concomitant use of alcohol or other substance unless prescribed by the medical provider.  Has completed prescribing agreement detailing the terms of continue prescribing him a controlled substance including monitoring Christie reports, the possibility of urine drug screens, and pill counts.  The patient is aware that we review CHRISTIE reports on a regular basis and scan them into the chart.  History and physical examination exhibited continued safe and appropriate use of controlled substances. We also discussed the fact that the new [place] legislation allows only a three-day prescription for pain medication.  In this situation he will be referred to a chronic pain clinic.                This document has been electronically signed by ROSALIND WILLIAM MD November 1, 2021 16:06 EDT

## 2021-11-02 ENCOUNTER — HOSPITAL ENCOUNTER (OUTPATIENT)
Dept: GENERAL RADIOLOGY | Facility: HOSPITAL | Age: 56
Discharge: HOME | End: 2021-11-02
Admitting: UROLOGY

## 2021-11-02 ENCOUNTER — TELEPHONE (OUTPATIENT)
Dept: UROLOGY | Facility: CLINIC | Age: 56
End: 2021-11-02

## 2021-11-02 DIAGNOSIS — N39.0 RECURRENT UTI: ICD-10-CM

## 2021-11-02 LAB — BACTERIA SPEC AEROBE CULT: NO GROWTH

## 2021-11-02 PROCEDURE — 74018 RADEX ABDOMEN 1 VIEW: CPT

## 2021-11-02 PROCEDURE — 74018 RADEX ABDOMEN 1 VIEW: CPT | Performed by: RADIOLOGY

## 2021-11-03 ENCOUNTER — TELEPHONE (OUTPATIENT)
Dept: UROLOGY | Facility: CLINIC | Age: 56
End: 2021-11-03

## 2021-11-03 NOTE — TELEPHONE ENCOUNTER
Called patient and let her know that her KUB showed that she passed all the stones and her urine culture came back negative. Patient verbalized understanding.

## 2021-12-06 ENCOUNTER — OFFICE VISIT (OUTPATIENT)
Dept: ORTHOPEDIC SURGERY | Facility: CLINIC | Age: 56
End: 2021-12-06

## 2021-12-06 VITALS — BODY MASS INDEX: 36.02 KG/M2 | WEIGHT: 211 LBS | HEIGHT: 64 IN

## 2021-12-06 DIAGNOSIS — M77.12 LEFT LATERAL EPICONDYLITIS: Primary | ICD-10-CM

## 2021-12-06 PROCEDURE — 20550 NJX 1 TENDON SHEATH/LIGAMENT: CPT | Performed by: ORTHOPAEDIC SURGERY

## 2021-12-06 RX ORDER — HYDROXYZINE PAMOATE 50 MG/1
CAPSULE ORAL
COMMUNITY
Start: 2021-11-30

## 2021-12-06 RX ORDER — AMANTADINE HYDROCHLORIDE 100 MG/1
TABLET ORAL
COMMUNITY
Start: 2021-11-19

## 2021-12-06 RX ADMIN — METHYLPREDNISOLONE ACETATE 40 MG: 40 INJECTION, SUSPENSION INTRA-ARTICULAR; INTRALESIONAL; INTRAMUSCULAR; SOFT TISSUE at 11:27

## 2021-12-06 RX ADMIN — LIDOCAINE HYDROCHLORIDE 5 ML: 20 INJECTION, SOLUTION INFILTRATION; PERINEURAL at 11:27

## 2021-12-06 NOTE — PROGRESS NOTES
Follow-up Visit         Patient: Dari Adams  YOB: 1965  Date of Encounter: 12/06/2021      Chief  Complaint:   Chief Complaint   Patient presents with   • Left Elbow - Follow-up, Pain         HPI:  Dari Adasm, 56 y.o. female presents in follow-up lateral epicondylitis left elbow previously received steroid injection August 2021 with excellent response for 3 months. Symptoms are now returning and she is requesting repeat injection.      Medical History:  Patient Active Problem List   Diagnosis   • Left ureteral calculus   • Recurrent UTI   • Renal calculi   • Hypercalcemia   • Left lateral epicondylitis   • Trigger thumb, right thumb     Past Medical History:   Diagnosis Date   • Asthma    • Chronic back pain    • Diabetes mellitus (HCC)    • Diverticulitis    • Gastritis    • GERD (gastroesophageal reflux disease)    • Gout    • High cholesterol    • Hyperlipemia    • Hypertension    • IBS (irritable bowel syndrome)    • Sleep apnea    • Thyroid disease          Social History:  Social History     Socioeconomic History   • Marital status:    Tobacco Use   • Smoking status: Former Smoker   • Smokeless tobacco: Never Used   Vaping Use   • Vaping Use: Never used   Substance and Sexual Activity   • Alcohol use: No   • Drug use: No   • Sexual activity: Yes     Partners: Male     Birth control/protection: None         Current Medications:    Current Outpatient Medications:   •  amitriptyline (ELAVIL) 25 MG tablet, TK 1 T PO  HS, Disp: , Rfl: 5  •  aspirin 81 MG EC tablet, Take 81 mg by mouth Daily., Disp: , Rfl:   •  atenolol (TENORMIN) 50 MG tablet, Take 50 mg by mouth Daily., Disp: , Rfl: 5  •  B-D ULTRAFINE III SHORT PEN 31G X 8 MM misc, U UTD, Disp: , Rfl: 5  •  Bydureon BCise 2 MG/0.85ML auto-injector injection, INJECT 1 SYRINGE  UNDER SKIN ONCE A WEEK, Disp: , Rfl:   •  dicyclomine (BENTYL) 20 MG tablet, TK 1 T PO  TID PRN, Disp: , Rfl: 6  •  famotidine (PEPCID) 20 MG tablet, Take 20  mg by mouth every night at bedtime., Disp: , Rfl:   •  fenofibrate (TRICOR) 145 MG tablet, Take 145 mg by mouth Daily., Disp: , Rfl:   •  FeroSul 325 (65 Fe) MG tablet, Take 1 tablet by mouth Daily., Disp: , Rfl:   •  furosemide (LASIX) 20 MG tablet, Take  by mouth Daily., Disp: , Rfl: 5  •  gabapentin (NEURONTIN) 300 MG capsule, TK ONE C PO  TID, Disp: , Rfl: 0  •  gabapentin (NEURONTIN) 600 MG tablet, Take 600 mg by mouth 3 (Three) Times a Day As Needed., Disp: , Rfl:   •  hydrochlorothiazide (HYDRODIURIL) 25 MG tablet, Take  by mouth Daily., Disp: , Rfl: 5  •  HYDROcodone-acetaminophen (NORCO)  MG per tablet, TK 1 T PO  BID PRN, Disp: , Rfl: 0  •  hydrOXYzine pamoate (VISTARIL) 50 MG capsule, , Disp: , Rfl:   •  Insulin Glargine (BASAGLAR KWIKPEN) 100 UNIT/ML injection pen, 50 Units., Disp: , Rfl:   •  levocetirizine (XYZAL) 5 MG tablet, Take 5 mg by mouth Daily., Disp: , Rfl:   •  levothyroxine (SYNTHROID, LEVOTHROID) 50 MCG tablet, TK 1 T PO  DAILY, Disp: , Rfl: 5  •  lisinopril (PRINIVIL,ZESTRIL) 5 MG tablet, Take  by mouth Daily., Disp: , Rfl: 2  •  metFORMIN (GLUCOPHAGE) 500 MG tablet, Take 500 mg by mouth 2 (Two) Times a Day With Meals., Disp: , Rfl:   •  Omega-3 Fatty Acids (FISH OIL) 1000 MG capsule capsule, TK 1 C PO QID, Disp: , Rfl: 6  •  ondansetron (ZOFRAN) 4 MG tablet, Take 1 tablet by mouth Daily As Needed for Nausea or Vomiting., Disp: 30 tablet, Rfl: 1  •  ONETOUCH VERIO test strip, USE TO TEST SUGAR BEFORE MEALS AND AT BEDTIME, Disp: , Rfl: 5  •  pravastatin (PRAVACHOL) 10 MG tablet, TK 1 T PO  QHS, Disp: , Rfl: 6  •  ProAir  (90 Base) MCG/ACT inhaler, INHALE 1 PUFF BY MOUTH EVERY 4 HOURS AS NEEDED, Disp: , Rfl:   •  promethazine (PHENERGAN) 25 MG tablet, Take 1 tablet by mouth Every 6 (Six) Hours As Needed for Nausea or Vomiting., Disp: 21 tablet, Rfl: 2  •  tamsulosin (Flomax) 0.4 MG capsule 24 hr capsule, Take 1 capsule by mouth Every Night., Disp: 30 capsule, Rfl: 5  •   tamsulosin (Flomax) 0.4 MG capsule 24 hr capsule, Take 1 capsule by mouth Every Night., Disp: 30 capsule, Rfl: 5  •  venlafaxine XR (EFFEXOR-XR) 150 MG 24 hr capsule, TK 1 C PO D, Disp: , Rfl: 4  •  amantadine (SYMMETREL) 100 MG tablet, , Disp: , Rfl:   •  Insulin Lispro (ADMELOG SOLOSTAR) 100 UNIT/ML injection pen, INJECT 25 UNITS UNDER SKIN TID, Disp: , Rfl:       Allergies:  Allergies   Allergen Reactions   • Tylenol With Codeine #3 [Acetaminophen-Codeine] Arrhythmia   • Codeine Hives, Itching and Other (See Comments)     Heart races   • Azithromycin Itching   • Contrast Dye Itching and Rash         Family History:  Family History   Problem Relation Age of Onset   • Heart disease Father    • Diabetes Father    • Osteoarthritis Mother    • Diabetes Mother    • Diabetes Sister    • Diabetes Brother    • Heart disease Maternal Grandmother    • Osteoarthritis Maternal Grandfather    • Heart disease Maternal Grandfather    • Diabetes Maternal Grandfather          Surgical History:  Past Surgical History:   Procedure Laterality Date   • APPENDECTOMY     • CHOLECYSTECTOMY     • HYSTERECTOMY           Examination:   Examination left elbow demonstrates exquisite tenderness over the lateral epicondyle pain with resisted wrist extension.        Assessment & Plan:   56 y.o. female presents with recurrent lateral epicondylitis left elbow at her request today she is provided Depo-Medrol 40 mg with lidocaine block superficial to the conjoined tendon.  She will follow-up in the future as needed.         Diagnosis Plan   1. Left lateral epicondylitis           Left lateral epicondyle    Date/Time: 12/6/2021 11:27 AM  Performed by: Mark Anthony Shanks MD  Authorized by: Mark Anthony Shanks MD   Consent: Verbal consent obtained. Written consent obtained.  Risks and benefits: risks, benefits and alternatives were discussed  Consent given by: patient  Patient understanding: patient states understanding of the procedure being  "performed  Patient consent: the patient's understanding of the procedure matches consent given  Procedure consent: procedure consent matches procedure scheduled  Relevant documents: relevant documents present and verified  Test results: test results available and properly labeled  Site marked: the operative site was marked  Imaging studies: imaging studies available  Patient identity confirmed: verbally with patient  Time out: Immediately prior to procedure a \"time out\" was called to verify the correct patient, procedure, equipment, support staff and site/side marked as required.  Preparation: Patient was prepped and draped in the usual sterile fashion.  Local anesthesia used: yes  Anesthesia: local infiltration    Anesthesia:  Local anesthesia used: yes  Anesthetic total: 5 mL    Sedation:  Patient sedated: no    Patient tolerance: Patient tolerated the procedure well with no immediate complications and patient tolerated the procedure well with no immediate complications  Medications administered: 5 mL lidocaine 2%; 40 mg methylPREDNISolone acetate 40 MG/ML              Cc:  Nina Vallejo APRN              This document has been electronically signed by Mark Anthony Shanks MD   December 8, 2021 11:26 EST  "

## 2021-12-08 RX ORDER — LIDOCAINE HYDROCHLORIDE 20 MG/ML
5 INJECTION, SOLUTION INFILTRATION; PERINEURAL
Status: COMPLETED | OUTPATIENT
Start: 2021-12-06 | End: 2021-12-06

## 2021-12-08 RX ORDER — METHYLPREDNISOLONE ACETATE 40 MG/ML
40 INJECTION, SUSPENSION INTRA-ARTICULAR; INTRALESIONAL; INTRAMUSCULAR; SOFT TISSUE
Status: COMPLETED | OUTPATIENT
Start: 2021-12-06 | End: 2021-12-06

## 2021-12-20 RX ORDER — EXENATIDE 2 MG/.85ML
INJECTION, SUSPENSION, EXTENDED RELEASE SUBCUTANEOUS
Qty: 3.4 ML | OUTPATIENT
Start: 2021-12-20

## 2021-12-20 RX ORDER — INSULIN GLARGINE 100 [IU]/ML
INJECTION, SOLUTION SUBCUTANEOUS
Qty: 30 ML | Refills: 6 | OUTPATIENT
Start: 2021-12-20

## 2022-02-14 RX ORDER — EXENATIDE 2 MG/.85ML
INJECTION, SUSPENSION, EXTENDED RELEASE SUBCUTANEOUS
Qty: 3.4 ML | OUTPATIENT
Start: 2022-02-14

## 2022-07-20 ENCOUNTER — TRANSCRIBE ORDERS (OUTPATIENT)
Dept: ADMINISTRATIVE | Facility: HOSPITAL | Age: 57
End: 2022-07-20

## 2022-07-20 DIAGNOSIS — R30.0 DYSURIA: Primary | ICD-10-CM

## 2022-07-22 ENCOUNTER — APPOINTMENT (OUTPATIENT)
Dept: CT IMAGING | Facility: HOSPITAL | Age: 57
End: 2022-07-22

## 2022-07-25 ENCOUNTER — HOSPITAL ENCOUNTER (OUTPATIENT)
Dept: GENERAL RADIOLOGY | Facility: HOSPITAL | Age: 57
Discharge: HOME | End: 2022-07-25
Admitting: UROLOGY

## 2022-07-25 ENCOUNTER — OFFICE VISIT (OUTPATIENT)
Dept: UROLOGY | Facility: CLINIC | Age: 57
End: 2022-07-25

## 2022-07-25 VITALS — BODY MASS INDEX: 33.46 KG/M2 | HEIGHT: 64 IN | WEIGHT: 196 LBS

## 2022-07-25 DIAGNOSIS — N20.0 KIDNEY STONE: Primary | ICD-10-CM

## 2022-07-25 DIAGNOSIS — N20.1 LEFT URETERAL CALCULUS: ICD-10-CM

## 2022-07-25 DIAGNOSIS — N39.0 RECURRENT UTI: ICD-10-CM

## 2022-07-25 DIAGNOSIS — N39.0 URINARY TRACT INFECTION WITHOUT HEMATURIA, SITE UNSPECIFIED: ICD-10-CM

## 2022-07-25 DIAGNOSIS — N20.0 KIDNEY STONE: ICD-10-CM

## 2022-07-25 LAB
BILIRUB BLD-MCNC: NEGATIVE MG/DL
CLARITY, POC: CLEAR
COLOR UR: YELLOW
EXPIRATION DATE: ABNORMAL
GLUCOSE UR STRIP-MCNC: NEGATIVE MG/DL
KETONES UR QL: NEGATIVE
LEUKOCYTE EST, POC: ABNORMAL
Lab: ABNORMAL
NITRITE UR-MCNC: NEGATIVE MG/ML
PH UR: 7.5 [PH] (ref 5–8)
PROT UR STRIP-MCNC: NEGATIVE MG/DL
RBC # UR STRIP: NEGATIVE /UL
SP GR UR: 1.01 (ref 1–1.03)
UROBILINOGEN UR QL: NORMAL

## 2022-07-25 PROCEDURE — 74018 RADEX ABDOMEN 1 VIEW: CPT | Performed by: RADIOLOGY

## 2022-07-25 PROCEDURE — 74018 RADEX ABDOMEN 1 VIEW: CPT

## 2022-07-25 PROCEDURE — 87086 URINE CULTURE/COLONY COUNT: CPT | Performed by: UROLOGY

## 2022-07-25 PROCEDURE — 99214 OFFICE O/P EST MOD 30 MIN: CPT | Performed by: UROLOGY

## 2022-07-25 RX ORDER — TAMSULOSIN HYDROCHLORIDE 0.4 MG/1
1 CAPSULE ORAL NIGHTLY
Qty: 30 CAPSULE | Refills: 5 | Status: SHIPPED | OUTPATIENT
Start: 2022-07-25 | End: 2023-02-13

## 2022-07-25 RX ORDER — HYDROCODONE BITARTRATE AND ACETAMINOPHEN 10; 325 MG/1; MG/1
1 TABLET ORAL EVERY 6 HOURS PRN
Qty: 20 TABLET | Refills: 0 | Status: SHIPPED | OUTPATIENT
Start: 2022-07-25

## 2022-07-25 NOTE — PROGRESS NOTES
Chief Complaint:      Chief Complaint   Patient presents with   • Kidney Stone       HPI:   57 y.o. female returns today with extremely metabolically active stone disease she complains of a left UVJ stone her KUB was noncontributory urine was positive I will go and culture the urine pending antibiotics until I get the culture back and get a CT scan and follow-up with her based on this    Past Medical History:     Past Medical History:   Diagnosis Date   • Asthma    • Chronic back pain    • Diabetes mellitus (HCC)    • Diverticulitis    • Gastritis    • GERD (gastroesophageal reflux disease)    • Gout    • High cholesterol    • Hyperlipemia    • Hypertension    • IBS (irritable bowel syndrome)    • Sleep apnea    • Thyroid disease        Current Meds:     Current Outpatient Medications   Medication Sig Dispense Refill   • amantadine (SYMMETREL) 100 MG tablet      • amitriptyline (ELAVIL) 25 MG tablet TK 1 T PO  HS  5   • aspirin 81 MG EC tablet Take 81 mg by mouth Daily.     • atenolol (TENORMIN) 50 MG tablet Take 50 mg by mouth Daily.  5   • B-D ULTRAFINE III SHORT PEN 31G X 8 MM misc U UTD  5   • Bydureon BCise 2 MG/0.85ML auto-injector injection INJECT 1 SYRINGE  UNDER SKIN ONCE A WEEK     • dicyclomine (BENTYL) 20 MG tablet TK 1 T PO  TID PRN  6   • famotidine (PEPCID) 20 MG tablet Take 20 mg by mouth every night at bedtime.     • fenofibrate (TRICOR) 145 MG tablet Take 145 mg by mouth Daily.     • FeroSul 325 (65 Fe) MG tablet Take 1 tablet by mouth Daily.     • furosemide (LASIX) 20 MG tablet Take  by mouth Daily.  5   • gabapentin (NEURONTIN) 300 MG capsule TK ONE C PO  TID  0   • gabapentin (NEURONTIN) 600 MG tablet Take 600 mg by mouth 3 (Three) Times a Day As Needed.     • hydrochlorothiazide (HYDRODIURIL) 25 MG tablet Take  by mouth Daily.  5   • HYDROcodone-acetaminophen (NORCO)  MG per tablet TK 1 T PO  BID PRN  0   • hydrOXYzine pamoate (VISTARIL) 50 MG capsule      • Insulin Glargine (BASAGLAR  KWIKPEN) 100 UNIT/ML injection pen 50 Units.     • Insulin Lispro (ADMELOG SOLOSTAR) 100 UNIT/ML injection pen INJECT 25 UNITS UNDER SKIN TID     • levocetirizine (XYZAL) 5 MG tablet Take 5 mg by mouth Daily.     • levothyroxine (SYNTHROID, LEVOTHROID) 50 MCG tablet TK 1 T PO  DAILY  5   • lisinopril (PRINIVIL,ZESTRIL) 5 MG tablet Take  by mouth Daily.  2   • metFORMIN (GLUCOPHAGE) 500 MG tablet Take 500 mg by mouth 2 (Two) Times a Day With Meals.     • Omega-3 Fatty Acids (FISH OIL) 1000 MG capsule capsule TK 1 C PO QID  6   • ondansetron (ZOFRAN) 4 MG tablet Take 1 tablet by mouth Daily As Needed for Nausea or Vomiting. 30 tablet 1   • ONETOUCH VERIO test strip USE TO TEST SUGAR BEFORE MEALS AND AT BEDTIME  5   • pravastatin (PRAVACHOL) 10 MG tablet TK 1 T PO  QHS  6   • ProAir  (90 Base) MCG/ACT inhaler INHALE 1 PUFF BY MOUTH EVERY 4 HOURS AS NEEDED     • promethazine (PHENERGAN) 25 MG tablet Take 1 tablet by mouth Every 6 (Six) Hours As Needed for Nausea or Vomiting. 21 tablet 2   • tamsulosin (Flomax) 0.4 MG capsule 24 hr capsule Take 1 capsule by mouth Every Night. 30 capsule 5   • tamsulosin (Flomax) 0.4 MG capsule 24 hr capsule Take 1 capsule by mouth Every Night. 30 capsule 5   • venlafaxine XR (EFFEXOR-XR) 150 MG 24 hr capsule TK 1 C PO D  4     No current facility-administered medications for this visit.        Allergies:      Allergies   Allergen Reactions   • Tylenol With Codeine #3 [Acetaminophen-Codeine] Arrhythmia   • Codeine Hives, Itching and Other (See Comments)     Heart races   • Latex Hives   • Azithromycin Itching   • Contrast Dye Itching and Rash        Past Surgical History:     Past Surgical History:   Procedure Laterality Date   • APPENDECTOMY     • CHOLECYSTECTOMY     • HYSTERECTOMY         Social History:     Social History     Socioeconomic History   • Marital status:    Tobacco Use   • Smoking status: Former Smoker   • Smokeless tobacco: Never Used   Vaping Use   • Vaping  Use: Never used   Substance and Sexual Activity   • Alcohol use: No   • Drug use: No   • Sexual activity: Yes     Partners: Male     Birth control/protection: None       Family History:     Family History   Problem Relation Age of Onset   • Heart disease Father    • Diabetes Father    • Osteoarthritis Mother    • Diabetes Mother    • Diabetes Sister    • Diabetes Brother    • Heart disease Maternal Grandmother    • Osteoarthritis Maternal Grandfather    • Heart disease Maternal Grandfather    • Diabetes Maternal Grandfather        Review of Systems:     Review of Systems   Constitutional: Negative.  Negative for activity change, appetite change, chills, diaphoresis, fatigue, fever and unexpected weight change.   HENT: Negative for congestion, dental problem, drooling, ear discharge, ear pain, facial swelling, hearing loss, mouth sores, nosebleeds, postnasal drip, rhinorrhea, sinus pressure, sneezing, sore throat, tinnitus, trouble swallowing and voice change.    Eyes: Negative.  Negative for photophobia, pain, discharge, redness, itching and visual disturbance.   Respiratory: Negative.  Negative for apnea, cough, choking, chest tightness, shortness of breath, wheezing and stridor.    Cardiovascular: Negative.  Negative for chest pain, palpitations and leg swelling.   Gastrointestinal: Positive for abdominal pain. Negative for abdominal distention, anal bleeding, blood in stool, constipation, diarrhea, nausea, rectal pain and vomiting.   Endocrine: Negative.  Negative for cold intolerance, heat intolerance, polydipsia, polyphagia and polyuria.   Genitourinary: Positive for flank pain.   Musculoskeletal: Negative for arthralgias, back pain, gait problem, joint swelling, myalgias, neck pain and neck stiffness.   Skin: Negative.  Negative for color change, pallor, rash and wound.   Allergic/Immunologic: Negative.  Negative for environmental allergies, food allergies and immunocompromised state.   Neurological: Negative.   Negative for dizziness, tremors, seizures, syncope, facial asymmetry, speech difficulty, weakness, light-headedness, numbness and headaches.   Hematological: Negative.  Negative for adenopathy. Does not bruise/bleed easily.   Psychiatric/Behavioral: Negative for agitation, behavioral problems, confusion, decreased concentration, dysphoric mood, hallucinations, self-injury, sleep disturbance and suicidal ideas. The patient is not nervous/anxious and is not hyperactive.    All other systems reviewed and are negative.      Physical Exam:     Physical Exam  Constitutional:       Appearance: She is well-developed.   HENT:      Head: Normocephalic and atraumatic.      Right Ear: External ear normal.      Left Ear: External ear normal.   Eyes:      Conjunctiva/sclera: Conjunctivae normal.      Pupils: Pupils are equal, round, and reactive to light.   Cardiovascular:      Rate and Rhythm: Normal rate and regular rhythm.      Heart sounds: Normal heart sounds.   Pulmonary:      Effort: Pulmonary effort is normal.      Breath sounds: Normal breath sounds.   Abdominal:      General: Bowel sounds are normal. There is no distension.      Palpations: Abdomen is soft. There is no mass.      Tenderness: There is no abdominal tenderness. There is no guarding or rebound.   Genitourinary:     Vagina: No vaginal discharge.   Musculoskeletal:         General: Normal range of motion.   Skin:     General: Skin is warm and dry.   Neurological:      Mental Status: She is alert.      Deep Tendon Reflexes: Reflexes are normal and symmetric.   Psychiatric:         Behavior: Behavior normal.         Thought Content: Thought content normal.         Judgment: Judgment normal.         I have reviewed the following portions of the patient's history: Allergies, current medications, past family history, past medical history, past social history, past surgical history, problem list, and ROS and confirm it is accurate.    Recent Image (CT and/or KUB):       CT Abdomen and Pelvis: No results found for this or any previous visit.       CT Stone Protocol: Results for orders placed during the hospital encounter of 05/04/21    CT Abdomen Pelvis Stone Protocol    Narrative  CT ABDOMEN AND PELVIS STONE PROTOCOL-    REASON FOR EXAM: Hematuria, unknown cause; N20.0-Calculus of kidney.    COMPARISON: Prior KUB dated 04/09/2021.    FINDINGS:  Spiral scans were obtained through the kidneys, ureters and  bladder. The kidneys showed no no evidence of hydronephrosis. There is a  very small nonobstructing stone in the lower pole of the left kidney  that measures approximately 1.5 mm in diameter. The ureters were not  dilated as they course through the abdomen and pelvis. There were no  ureteral stones demonstrated. Urinary bladder is only minimally  distended with urine but was otherwise unremarkable.    Impression  A 1.5 mm stone in the lower pole of the left kidney. There  was no evidence of ureteral stone or hydronephrosis.      981.23 mGy.cm  The radiation dose reduction device was utilized for each scan per the  ALARA (as low as reasonably achievable) protocol.    This report was finalized on 5/5/2021 7:57 AM by Dr. Edward Wu II, MD.       KUB: Results for orders placed during the hospital encounter of 11/02/21    XR Abdomen KUB    Narrative  XR ABDOMEN KUB-    REASON FOR EXAM:  stone; N39.0-Urinary tract infection, site not  specified    COMPARISON: Previous KUB dated 09/13/2021.    The bowel gas pattern shows nothing to suggest obstruction. No soft  tissue masses were demonstrated. No calcifications are identified  overlying the kidneys or along the course of the ureters.    Impression  Nonspecific supine view of the abdomen.    This report was finalized on 11/2/2021 1:26 PM by Dr. Edward Wu II, MD.       Labs (past 3 months):      No visits with results within 3 Month(s) from this visit.   Latest known visit with results is:   Office Visit on 11/01/2021    Component Date Value Ref Range Status   • Color 11/01/2021 Yellow  Yellow, Straw, Dark Yellow, Ana Final   • Clarity, UA 11/01/2021 Clear  Clear Final   • Specific Gravity  11/01/2021 1.030  1.005 - 1.030 Final   • pH, Urine 11/01/2021 5.0  5.0 - 8.0 Final   • Leukocytes 11/01/2021 Small (1+) (A) Negative Final   • Nitrite, UA 11/01/2021 Negative  Negative Final   • Protein, POC 11/01/2021 Negative  Negative mg/dL Final   • Glucose, UA 11/01/2021 Negative  Negative, 1000 mg/dL (3+) mg/dL Final   • Ketones, UA 11/01/2021 Negative  Negative Final   • Urobilinogen, UA 11/01/2021 Normal  Normal Final   • Bilirubin 11/01/2021 Negative  Negative Final   • Blood, UA 11/01/2021 Negative  Negative Final   • Lot Number 11/01/2021 [id number]   Final   • Expiration Date 11/01/2021 63,023   Final   • Urine Culture 11/01/2021 No growth   Final        Procedure:       Assessment/Plan:   Ureteral calculus-patient has been diagnosed with a ureteral calculus.  We have discussed the various parameters regarding spontaneous passage including the notion that a 2 mm stone has a high likelihood of spontaneous passage versus a larger stone being caught up in the upper areas of the urinary tract.  We also discussed the medical management of stone disease and the use of medical expulsive therapy in the form of Flomax.  This is used in an off label setting.  We discussed the indicators for intervention including  absolute indicators such as sepsis and uncontrollable severe pain, as well as the relative indicators of moderate pain that is well-controlled with various analgesia.  I also talked about nonoperative management including ambulation and increasing fluids and hot tub as being an effective adjuncts in the treatment of a ureteral stone.  KUB is noncontributory I Corine go ahead and get a CT scan  Recurrent urinary tract infections-patient has been referred and diagnosed with recurrent urinary tract infections.  We discussed the  types of organisms that are found in the urinary tract indicating that the vast majority are results of the patient's own gastrointestinal milind.  We discussed how many of the antibiotics that are utilized can actually exacerbate these infections by creating resistant organisms and there is only very few antibiotics that are concentrated in the urine and do not affect the rectal reservoir nor cause recurrent yeast vaginitis.  We discussed the risk factors for recurrent infections being intercourse in younger patients and atrophic changes in older patients.  We discussed the symptoms that are found including pain, pressure, burning, frequency, urgency, suprapubic pain, and painful intercourse.  I discussed upper tract symptoms including fevers and chills and indicated the workup would be much more aggressive if the patient were to present with recurrent infections in the face of upper tract symptomatology such as fever.  I discussed the history of vesicoureteral reflux in young patients and finally chronic renal scarring as a result of such.  I recommend concomitant probiotics with treatment with antibiotics to protect the rectal reservoir including over-the-counter yogurt preparations to clement oral pills containing the appropriate probiotics.  She had a positive urine but I would await the results of the culture  Narcotic pain medication-patient has significant acute pain that I believe would be an indication for the use of narcotic pain medication.  I discussed the significant risks of pain medication and the fact that this will be a short only option and I will give her no more than a three-day supply of pain medication, I will not plan long-term medication, and that this will be sent to a pain clinic if it at all becomes necessary.  We discussed signing a pain medication agreement and the fact that we're going to run a state [facility] review to be sure the patient is not getting pain medication from elsewhere.  If  this is the case, we will not give pain medication as part of the patient's treatment plan of there being prescribed a controlled substance with potential for abuse.  This patient has been well aware of the appropriate dose of such medications including the risks for somnolence, limited ability to drive and/or safety and the significant potential for overdose.  It has been made clear that these medications are for the prescribed patient only without concomitant use of alcohol or other substance unless prescribed by the medical provider.  Has completed prescribing agreement detailing the terms of continue prescribing him a controlled substance including monitoring Christie reports, the possibility of urine drug screens, and pill counts.  The patient is aware that we review CHRISTIE reports on a regular basis and scan them into the chart.  History and physical examination exhibited continued safe and appropriate use of controlled substances. We also discussed the fact that the new [place] legislation allows only a three-day prescription for pain medication.  In this situation he will be referred to a chronic pain clinic.                  This document has been electronically signed by ROSALIND WILLIAM MD July 25, 2022 14:17 EDT    Dictated Utilizing Dragon Dictation: Part of this note may be an electronic transcription/translation of spoken language to printed text using the Dragon Dictation System.

## 2022-07-26 ENCOUNTER — TELEPHONE (OUTPATIENT)
Dept: UROLOGY | Facility: CLINIC | Age: 57
End: 2022-07-26

## 2022-07-26 LAB — BACTERIA SPEC AEROBE CULT: NO GROWTH

## 2022-07-26 NOTE — TELEPHONE ENCOUNTER
Routing to Nept[facility]    I CALLED AND LEFT THE PT A MESSAGE THAT WE WERE WAITING ON HER URINE CULTURE WHICH IS BACK AND NORMAL

## 2022-07-26 NOTE — TELEPHONE ENCOUNTER
Patient did not receive the antibiotic for her uti.  It needs to be sent to [facility] in [place].

## 2022-08-26 ENCOUNTER — TELEPHONE (OUTPATIENT)
Dept: UROLOGY | Facility: CLINIC | Age: 57
End: 2022-08-26

## 2022-08-26 ENCOUNTER — HOSPITAL ENCOUNTER (OUTPATIENT)
Dept: CT IMAGING | Facility: HOSPITAL | Age: 57
Discharge: HOME | End: 2022-08-26
Admitting: NURSE PRACTITIONER

## 2022-08-26 DIAGNOSIS — R30.0 DYSURIA: ICD-10-CM

## 2022-08-26 PROCEDURE — 74176 CT ABD & PELVIS W/O CONTRAST: CPT

## 2022-08-26 PROCEDURE — 74176 CT ABD & PELVIS W/O CONTRAST: CPT | Performed by: RADIOLOGY

## 2022-08-26 NOTE — TELEPHONE ENCOUNTER
Called patient she is asking if someone can read CT and tell her what is going on I have informed her Dr Gunderson is out of town I will ask one of our other providers if they can view it but I am not sure I will call her back as soon as I hear something

## 2022-09-06 ENCOUNTER — OFFICE VISIT (OUTPATIENT)
Dept: UROLOGY | Facility: CLINIC | Age: 57
End: 2022-09-06

## 2022-09-06 VITALS — HEIGHT: 64 IN | WEIGHT: 196 LBS | BODY MASS INDEX: 33.46 KG/M2

## 2022-09-06 DIAGNOSIS — N39.0 URINARY TRACT INFECTION WITHOUT HEMATURIA, SITE UNSPECIFIED: Primary | ICD-10-CM

## 2022-09-06 LAB
BILIRUB BLD-MCNC: NEGATIVE MG/DL
CLARITY, POC: CLEAR
COLOR UR: YELLOW
EXPIRATION DATE: ABNORMAL
GLUCOSE UR STRIP-MCNC: NEGATIVE MG/DL
KETONES UR QL: NEGATIVE
LEUKOCYTE EST, POC: ABNORMAL
Lab: ABNORMAL
NITRITE UR-MCNC: NEGATIVE MG/ML
PH UR: 7 [PH] (ref 5–8)
PROT UR STRIP-MCNC: NEGATIVE MG/DL
RBC # UR STRIP: NEGATIVE /UL
SP GR UR: 1.01 (ref 1–1.03)
UROBILINOGEN UR QL: NORMAL

## 2022-09-06 PROCEDURE — 87086 URINE CULTURE/COLONY COUNT: CPT

## 2022-09-06 PROCEDURE — 99214 OFFICE O/P EST MOD 30 MIN: CPT

## 2022-09-06 RX ORDER — SULFAMETHOXAZOLE AND TRIMETHOPRIM 800; 160 MG/1; MG/1
1 TABLET ORAL 2 TIMES DAILY
Qty: 20 TABLET | Refills: 0 | Status: SHIPPED | OUTPATIENT
Start: 2022-09-06 | End: 2022-09-07

## 2022-09-06 NOTE — PROGRESS NOTES
"Chief Complaint:    Chief Complaint   Patient presents with   • Urinary Tract Infection     Recurrent       Vital Signs:   Ht 162.6 cm (64.02\")   Wt 88.9 kg (196 lb)   BMI 33.62 kg/m²   Body mass index is 33.62 kg/m².      HPI:  Dari Adams is a 57 y.o. female who presents today for follow up    Ms. Adams presents to the clinic today for possible urinary tract infection.  She is well-known to the clinic has been seen by Dr. Gunderson previously for UTIs and nephrolithiasis.   She was last seen on 7/26/2022 for possible urinary tract infection. Urine culture showed no growth. A KUB was completed which was unremarkable.  Urinalysis today reveals 3+ leukocytes.  She reports dysuria, left flank pain, back pain, frequency, urgency, and hematuria.  CT scan was recently completed on 8/26/2022 that revealed a nonobstructing left renal stone in the lower pole of the kidney.  She is currently taking Flomax.  I advised the use of a KUB today to identify location of stone however patient declined.  I informed patient that we will culture urine and call with results.  Patient states that she would like pain medication at this time however I informed her that I am unable to prescribe narcotics.  She states that she will call Dr. Gunderson when he returns in office for refill.      Past Medical History:  Past Medical History:   Diagnosis Date   • Asthma    • Chronic back pain    • Diabetes mellitus (HCC)    • Diverticulitis    • Gastritis    • GERD (gastroesophageal reflux disease)    • Gout    • High cholesterol    • Hyperlipemia    • Hypertension    • IBS (irritable bowel syndrome)    • Sleep apnea    • Thyroid disease        Current Meds:  Current Outpatient Medications   Medication Sig Dispense Refill   • amantadine (SYMMETREL) 100 MG tablet      • amitriptyline (ELAVIL) 25 MG tablet TK 1 T PO  HS  5   • aspirin 81 MG EC tablet Take 81 mg by mouth Daily.     • atenolol (TENORMIN) 50 MG tablet Take 50 mg by mouth Daily.  5   • " B-D ULTRAFINE III SHORT PEN 31G X 8 MM misc U UTD  5   • Bydureon BCise 2 MG/0.85ML auto-injector injection INJECT 1 SYRINGE  UNDER SKIN ONCE A WEEK     • dicyclomine (BENTYL) 20 MG tablet TK 1 T PO  TID PRN  6   • famotidine (PEPCID) 20 MG tablet Take 20 mg by mouth every night at bedtime.     • fenofibrate (TRICOR) 145 MG tablet Take 145 mg by mouth Daily.     • FeroSul 325 (65 Fe) MG tablet Take 1 tablet by mouth Daily.     • furosemide (LASIX) 20 MG tablet Take  by mouth Daily.  5   • gabapentin (NEURONTIN) 300 MG capsule TK ONE C PO  TID  0   • gabapentin (NEURONTIN) 600 MG tablet Take 600 mg by mouth 3 (Three) Times a Day As Needed.     • hydrochlorothiazide (HYDRODIURIL) 25 MG tablet Take  by mouth Daily.  5   • HYDROcodone-acetaminophen (NORCO)  MG per tablet TK 1 T PO  BID PRN  0   • HYDROcodone-acetaminophen (Norco)  MG per tablet Take 1 tablet by mouth Every 6 (Six) Hours As Needed for Moderate Pain . 20 tablet 0   • hydrOXYzine pamoate (VISTARIL) 50 MG capsule      • Insulin Glargine (BASAGLAR KWIKPEN) 100 UNIT/ML injection pen 50 Units.     • Insulin Lispro (ADMELOG SOLOSTAR) 100 UNIT/ML injection pen INJECT 25 UNITS UNDER SKIN TID     • levocetirizine (XYZAL) 5 MG tablet Take 5 mg by mouth Daily.     • levothyroxine (SYNTHROID, LEVOTHROID) 50 MCG tablet TK 1 T PO  DAILY  5   • lisinopril (PRINIVIL,ZESTRIL) 5 MG tablet Take  by mouth Daily.  2   • metFORMIN (GLUCOPHAGE) 500 MG tablet Take 500 mg by mouth 2 (Two) Times a Day With Meals.     • Omega-3 Fatty Acids (FISH OIL) 1000 MG capsule capsule TK 1 C PO QID  6   • ondansetron (ZOFRAN) 4 MG tablet Take 1 tablet by mouth Daily As Needed for Nausea or Vomiting. 30 tablet 1   • ONETOUCH VERIO test strip USE TO TEST SUGAR BEFORE MEALS AND AT BEDTIME  5   • pravastatin (PRAVACHOL) 10 MG tablet TK 1 T PO  QHS  6   • ProAir  (90 Base) MCG/ACT inhaler INHALE 1 PUFF BY MOUTH EVERY 4 HOURS AS NEEDED     • promethazine (PHENERGAN) 25 MG tablet  Take 1 tablet by mouth Every 6 (Six) Hours As Needed for Nausea or Vomiting. 21 tablet 2   • tamsulosin (Flomax) 0.4 MG capsule 24 hr capsule Take 1 capsule by mouth Every Night. 30 capsule 5   • tamsulosin (Flomax) 0.4 MG capsule 24 hr capsule Take 1 capsule by mouth Every Night. 30 capsule 5   • tamsulosin (Flomax) 0.4 MG capsule 24 hr capsule Take 1 capsule by mouth Every Night. 30 capsule 5   • venlafaxine XR (EFFEXOR-XR) 150 MG 24 hr capsule TK 1 C PO D  4     No current facility-administered medications for this visit.        Allergies:   Allergies   Allergen Reactions   • Tylenol With Codeine #3 [Acetaminophen-Codeine] Arrhythmia   • Codeine Hives, Itching and Other (See Comments)     Heart races   • Latex Hives   • Azithromycin Itching   • Contrast Dye Itching and Rash        Past Surgical History:  Past Surgical History:   Procedure Laterality Date   • APPENDECTOMY     • CHOLECYSTECTOMY     • HYSTERECTOMY         Social History:  Social History     Socioeconomic History   • Marital status:    Tobacco Use   • Smoking status: Former Smoker   • Smokeless tobacco: Never Used   Vaping Use   • Vaping Use: Never used   Substance and Sexual Activity   • Alcohol use: No   • Drug use: No   • Sexual activity: Yes     Partners: Male     Birth control/protection: None       Family History:  Family History   Problem Relation Age of Onset   • Heart disease Father    • Diabetes Father    • Osteoarthritis Mother    • Diabetes Mother    • Diabetes Sister    • Diabetes Brother    • Heart disease Maternal Grandmother    • Osteoarthritis Maternal Grandfather    • Heart disease Maternal Grandfather    • Diabetes Maternal Grandfather        Review of Systems:  Review of Systems   Constitutional: Negative for fatigue, fever and unexpected weight change.   Respiratory: Negative for chest tightness and shortness of breath.    Cardiovascular: Negative for chest pain.   Gastrointestinal: Positive for abdominal pain and nausea.  Negative for constipation, diarrhea and vomiting.   Genitourinary: Positive for dysuria, flank pain, frequency, hematuria and urgency. Negative for difficulty urinating.   Musculoskeletal: Positive for back pain.   Skin: Negative for rash.   Psychiatric/Behavioral: Negative for confusion and suicidal ideas.       Physical Exam:  Physical Exam    Recent Image (CT and/or KUB):   CT Abdomen and Pelvis: No results found for this or any previous visit.     CT Stone Protocol: Results for orders placed during the hospital encounter of 05/04/21    CT Abdomen Pelvis Stone Protocol    Narrative  CT ABDOMEN AND PELVIS STONE PROTOCOL-    REASON FOR EXAM: Hematuria, unknown cause; N20.0-Calculus of kidney.    COMPARISON: Prior KUB dated 04/09/2021.    FINDINGS:  Spiral scans were obtained through the kidneys, ureters and  bladder. The kidneys showed no no evidence of hydronephrosis. There is a  very small nonobstructing stone in the lower pole of the left kidney  that measures approximately 1.5 mm in diameter. The ureters were not  dilated as they course through the abdomen and pelvis. There were no  ureteral stones demonstrated. Urinary bladder is only minimally  distended with urine but was otherwise unremarkable.    Impression  A 1.5 mm stone in the lower pole of the left kidney. There  was no evidence of ureteral stone or hydronephrosis.      981.23 mGy.cm  The radiation dose reduction device was utilized for each scan per the  ALARA (as low as reasonably achievable) protocol.    This report was finalized on 5/5/2021 7:57 AM by Dr. Edward Wu II, MD.     KUB: Results for orders placed during the hospital encounter of 07/25/22    XR Abdomen KUB    Narrative  XR ABDOMEN KUB-    REASON FOR EXAM:  kidney stone; N20.0-Calculus of kidney    Comparison:Previous KUB from November 2021.      The bowel gas pattern in the abdomen is unremarkable. No soft tissue  masses or pathological calcifications are demonstrated. The  bony  structures are appropriate for age. Kidney stone is not demonstrated.    Impression  Nonspecific supine view abdomen    This report was finalized on 7/25/2022 2:30 PM by Dr. Edward Wu II, MD.       Labs:  Brief Urine Lab Results  (Last result in the past 365 days)      Color   Clarity   Blood   Leuk Est   Nitrite   Protein   CREAT   Urine HCG        07/25/22 1438 Yellow   Clear   Negative   500 Eric/ul   Negative   Negative               Office Visit on 07/25/2022   Component Date Value Ref Range Status   • Color 07/25/2022 Yellow  Yellow, Straw, Dark Yellow, Ana Final   • Clarity, UA 07/25/2022 Clear  Clear Final   • Specific Gravity  07/25/2022 1.015  1.005 - 1.030 Final   • pH, Urine 07/25/2022 7.5  5.0 - 8.0 Final   • Leukocytes 07/25/2022 500 Eric/ul (A) Negative Final   • Nitrite, UA 07/25/2022 Negative  Negative Final   • Protein, POC 07/25/2022 Negative  Negative mg/dL Final   • Glucose, UA 07/25/2022 Negative  Negative mg/dL Final   • Ketones, UA 07/25/2022 Negative  Negative Final   • Urobilinogen, UA 07/25/2022 Normal  Normal Final   • Bilirubin 07/25/2022 Negative  Negative Final   • Blood, UA 07/25/2022 Negative  Negative Final   • Lot Number 07/25/2022 [id number]   Final   • Expiration Date 07/25/2022 [id number]   Final   • Urine Culture 07/25/2022 No growth   Final        Procedure: None  Procedures     I have reviewed and agree with the above PMH, PSH, FMH, social history, medications, allergies, and labs.     Assessment/Plan:   Problem List Items Addressed This Visit    None         Health Maintenance:   Health Maintenance Due   Topic Date Due   • MAMMOGRAM  Never done   • COLORECTAL CANCER SCREENING  Never done   • COVID-19 Vaccine (1) Never done   • ANNUAL PHYSICAL  Never done   • ZOSTER VACCINE (1 of 2) Never done   • HEPATITIS C SCREENING  Never done   • PAP SMEAR  Never done   • LIPID PANEL  Never done        Smoking Counseling: Patient is a former smoker.    Urine Incontinence:  Patient reports that she is not currently experiencing any symptoms of urinary incontinence.    Patient was given instructions and counseling regarding her condition or for health maintenance advice. Please see specific information pulled into the AVS if appropriate.    Patient Education:   Urinary tract infection -urinalysis today shows 3+ leukocytes.  Patient states that she would currently like antibiotics until culture results are obtained.  We will start the patient on Bactrim twice daily.  Advised the patient to increase water intake daily.  I will call the patient with culture results.  Patient advised to return to clinic or go to nearest ER if symptoms worsen or do not improve.  Renal calculus -I discussed with the patient the presence of a 2 to 3 mm stone located in the lower pole of the left kidney.  I advised the patient that given its size she has high likelihood of passing the stone on her own.  I recommended a repeat KUB today for identification of stone.  However patient declined.  Advised patient to continue Flomax and return to the clinic or the nearest ER if symptoms worsen.  Patient states that she would like a refill on narcotics however I informed her that I am unable to prescribe them.  She reports that she will call Dr. Neptali henning for a refill.    Visit Diagnoses:  No diagnosis found.    Meds Ordered During Visit:  No orders of the defined types were placed in this encounter.      Follow Up Appointment: As needed  No follow-ups on file.      This document has been electronically signed by Dayanara Rubio CMA   September 6, 2022 11:40 EDT    Part of this note may be an electronic transcription/translation of spoken language to printed text using the Dragon Dictation System.

## 2022-09-07 DIAGNOSIS — N39.0 RECURRENT UTI: Primary | ICD-10-CM

## 2022-09-07 LAB — BACTERIA SPEC AEROBE CULT: NO GROWTH

## 2022-09-07 RX ORDER — CIPROFLOXACIN 500 MG/1
TABLET, FILM COATED ORAL
Qty: 6 TABLET | Refills: 0 | Status: SHIPPED | OUTPATIENT
Start: 2022-09-07

## 2022-09-07 RX ORDER — PHENAZOPYRIDINE HYDROCHLORIDE 200 MG/1
200 TABLET, FILM COATED ORAL 3 TIMES DAILY PRN
Qty: 20 TABLET | Refills: 0 | Status: SHIPPED | OUTPATIENT
Start: 2022-09-07

## 2022-09-09 ENCOUNTER — TELEPHONE (OUTPATIENT)
Dept: UROLOGY | Facility: CLINIC | Age: 57
End: 2022-09-09

## 2022-09-09 NOTE — TELEPHONE ENCOUNTER
Caller: RODRIGO    Relationship: MUTUAL PROVIDER    Best call back number: [phone]    What form or medical record are you requesting: LAST OFFICE VISIT NOTE    Who is requesting this form or medical record from you: MUTUAL/REFERRING OFFICE    How would you like to receive the form or medical records (pick-up, mail, fax): FAX  If fax, what is the fax number: [phone]  If mail, what is the address:   If pick-up, provide patient with address and location details    Timeframe paperwork needed: ASAP    Additional notes: NEEDS OFFICE VISIT NOTE FROM WHEN PT SEEN [facility] SEPT. 2022.

## 2022-10-24 ENCOUNTER — APPOINTMENT (OUTPATIENT)
Dept: BONE DENSITY | Facility: HOSPITAL | Age: 57
End: 2022-10-24

## 2023-02-13 DIAGNOSIS — N20.0 KIDNEY STONE: ICD-10-CM

## 2023-02-13 RX ORDER — TAMSULOSIN HYDROCHLORIDE 0.4 MG/1
1 CAPSULE ORAL NIGHTLY
Qty: 30 CAPSULE | Refills: 5 | Status: SHIPPED | OUTPATIENT
Start: 2023-02-13

## 2023-08-11 DIAGNOSIS — N20.0 KIDNEY STONE: ICD-10-CM

## 2023-08-11 RX ORDER — TAMSULOSIN HYDROCHLORIDE 0.4 MG/1
1 CAPSULE ORAL NIGHTLY
Qty: 30 CAPSULE | Refills: 5 | Status: SHIPPED | OUTPATIENT
Start: 2023-08-11